# Patient Record
Sex: FEMALE | Race: BLACK OR AFRICAN AMERICAN | NOT HISPANIC OR LATINO | Employment: FULL TIME | URBAN - METROPOLITAN AREA
[De-identification: names, ages, dates, MRNs, and addresses within clinical notes are randomized per-mention and may not be internally consistent; named-entity substitution may affect disease eponyms.]

---

## 2017-05-01 ENCOUNTER — PATIENT MESSAGE (OUTPATIENT)
Dept: HEPATOLOGY | Facility: CLINIC | Age: 37
End: 2017-05-01

## 2017-05-01 DIAGNOSIS — R79.89 ELEVATED LFTS: Primary | ICD-10-CM

## 2017-06-09 ENCOUNTER — OFFICE VISIT (OUTPATIENT)
Dept: HEPATOLOGY | Facility: CLINIC | Age: 37
End: 2017-06-09
Payer: COMMERCIAL

## 2017-06-09 ENCOUNTER — HOSPITAL ENCOUNTER (OUTPATIENT)
Dept: RADIOLOGY | Facility: HOSPITAL | Age: 37
Discharge: HOME | End: 2017-06-09
Attending: NURSE PRACTITIONER
Payer: COMMERCIAL

## 2017-06-09 VITALS
HEART RATE: 89 BPM | BODY MASS INDEX: 32.18 KG/M2 | OXYGEN SATURATION: 99 % | WEIGHT: 188.5 LBS | DIASTOLIC BLOOD PRESSURE: 80 MMHG | SYSTOLIC BLOOD PRESSURE: 130 MMHG | HEIGHT: 64 IN | RESPIRATION RATE: 18 BRPM

## 2017-06-09 DIAGNOSIS — R79.89 ELEVATED LFTS: ICD-10-CM

## 2017-06-09 DIAGNOSIS — R79.89 ELEVATED LFTS: Primary | ICD-10-CM

## 2017-06-09 PROCEDURE — 76700 US EXAM ABDOM COMPLETE: CPT | Mod: 26,,, | Performed by: RADIOLOGY

## 2017-06-09 PROCEDURE — 76700 US EXAM ABDOM COMPLETE: CPT | Mod: TC

## 2017-06-09 PROCEDURE — 99213 OFFICE O/P EST LOW 20 MIN: CPT | Mod: S$GLB,,, | Performed by: NURSE PRACTITIONER

## 2017-06-09 PROCEDURE — 99999 PR PBB SHADOW E&M-EST. PATIENT-LVL III: CPT | Mod: PBBFAC,,, | Performed by: NURSE PRACTITIONER

## 2017-06-09 RX ORDER — ESTAZOLAM 2 MG/1
TABLET ORAL
Refills: 1 | COMMUNITY
Start: 2017-04-19 | End: 2018-06-01

## 2017-06-09 NOTE — PROGRESS NOTES
Hepatology Follow-up    Original Referring Physician:  Dr. Marco Antonio Barton  Current Corresponding Physician:  Dr. Marco Antonio Barton    Subjective:     Zeferino Moon is here for follow up of Follow-up      HPI  Ms. Zeferino Moon' is a 36 yo female here for f/u of elevated LFTs   Liver w/u serologies negative for A1AT, Eben's, AIH, HH  Outside HCV in 2015 showed reactive HCV Ab w/ undetectable RNA  Repeat labs here on 2/2016 and 8/2016 showed HCV Ab negative w/ negative RNA quant  + immunity to HAV/HBV  fibroscan showed no fibrosis, F0 (2/2/16)  Imaging was essentially w/ unremarkable liver and spleen  There is known family hx of fatty liver in mother, no known cirrhosis  Alcohol use is very occasional  She is overweight w/ a BMI of 32    LFTs with elevated transaminases since at least 2014, w/ normal synthetic liver function  She has managed to lose about 20# with lifestyle changes with a concomitant decrease in transaminases.    US 5/1/17:  Pancreas:  Normal.     Aorta:  No aneurysm.      Inferior vena cava:  Normal in appearance.    Gallbladder:  Contains 2 mobile gallstones measuring up to 6.0 mm. There is no gallbladder wall thickening or pericholecystic fluid. Sonographic Kim's sign is negative. The common duct is nondilated measuring 2.0 mm. There are no dilated intrahepatic radicles present.    Liver:  At the upper limits of normal in size measuring 17.7 cm.  The liver demonstrates homogenous echotexture. The hepatorenal adnexa is 1.59.  No focal hepatic lesions are seen.    Right kidney:  Normal in size measuring 10.9 cm without nephrolithiasis or hydronephrosis.      Left kidney:  Normal in size measuring 11.3 cm without nephrolithiasis or hydronephrosis.      Spleen:  Normal in size measuring 8.4 x 4.1 cm with a homogeneous echotexture.    Miscellaneous:  No ascites    Review of Systems   Constitutional: Negative for activity change, appetite change, chills, diaphoresis, fatigue, fever and  unexpected weight change.   HENT: Negative for facial swelling and nosebleeds.    Respiratory: Negative for cough, chest tightness and shortness of breath.    Cardiovascular: Negative for chest pain, palpitations and leg swelling.   Gastrointestinal: Negative for abdominal distention, abdominal pain, blood in stool, constipation, diarrhea, nausea and vomiting.   Musculoskeletal: Negative for neck pain and neck stiffness.   Skin: Negative for color change, pallor and rash.   Neurological: Negative for dizziness, tremors, syncope, weakness, light-headedness and headaches.   Hematological: Negative for adenopathy. Does not bruise/bleed easily.   Psychiatric/Behavioral: Negative for agitation, behavioral problems, confusion and decreased concentration.       Objective:     Physical Exam   Constitutional: She is oriented to person, place, and time. She appears well-developed and well-nourished. No distress.   HENT:   Head: Normocephalic and atraumatic.   Mouth/Throat: No oropharyngeal exudate.   Eyes: Conjunctivae are normal. Pupils are equal, round, and reactive to light. No scleral icterus.   Neck: Normal range of motion.   Cardiovascular: Normal rate.    Pulmonary/Chest: Effort normal.   Abdominal: Soft. She exhibits no distension and no mass. There is no tenderness. There is no rebound and no guarding.   Musculoskeletal: Normal range of motion.   Neurological: She is alert and oriented to person, place, and time. Coordination normal.   Skin: Skin is warm and dry. No rash noted. She is not diaphoretic. No erythema. No pallor.   Psychiatric: She has a normal mood and affect. Her behavior is normal. Judgment and thought content normal.       MELD-Na score: 6 at 6/9/2017  1:24 PM  MELD score: 6 at 6/9/2017  1:24 PM  Calculated from:  Serum Creatinine: 0.7 mg/dL (Rounded to 1) at 6/9/2017  1:24 PM  Serum Sodium: 138 mmol/L (Rounded to 137) at 6/9/2017  1:24 PM  Total Bilirubin: 0.5 mg/dL (Rounded to 1) at 6/9/2017  1:24  PM  INR(ratio): 1.0 at 6/9/2017  1:24 PM  Age: 37 years    WBC   Date Value Ref Range Status   06/09/2017 3.23 (L) 3.90 - 12.70 K/uL Final     Hemoglobin   Date Value Ref Range Status   06/09/2017 13.3 12.0 - 16.0 g/dL Final     Hematocrit   Date Value Ref Range Status   06/09/2017 39.6 37.0 - 48.5 % Final     Platelets   Date Value Ref Range Status   06/09/2017 281 150 - 350 K/uL Final     BUN, Bld   Date Value Ref Range Status   06/09/2017 8 6 - 20 mg/dL Final     Creatinine   Date Value Ref Range Status   06/09/2017 0.7 0.5 - 1.4 mg/dL Final     Glucose   Date Value Ref Range Status   06/09/2017 80 70 - 110 mg/dL Final     Calcium   Date Value Ref Range Status   06/09/2017 9.6 8.7 - 10.5 mg/dL Final     Sodium   Date Value Ref Range Status   06/09/2017 138 136 - 145 mmol/L Final     Potassium   Date Value Ref Range Status   06/09/2017 4.3 3.5 - 5.1 mmol/L Final     Chloride   Date Value Ref Range Status   06/09/2017 102 95 - 110 mmol/L Final     AST   Date Value Ref Range Status   06/09/2017 38 10 - 40 U/L Final     ALT   Date Value Ref Range Status   06/09/2017 64 (H) 10 - 44 U/L Final     Alkaline Phosphatase   Date Value Ref Range Status   06/09/2017 80 55 - 135 U/L Final     Total Bilirubin   Date Value Ref Range Status   06/09/2017 0.5 0.1 - 1.0 mg/dL Final     Comment:     For infants and newborns, interpretation of results should be based  on gestational age, weight and in agreement with clinical  observations.  Premature Infant recommended reference ranges:  Up to 24 hours.............<8.0 mg/dL  Up to 48 hours............<12.0 mg/dL  3-5 days..................<15.0 mg/dL  6-29 days.................<15.0 mg/dL       Albumin   Date Value Ref Range Status   06/09/2017 3.9 3.5 - 5.2 g/dL Final     INR   Date Value Ref Range Status   06/09/2017 1.0 0.8 - 1.2 Final     Comment:     Coumadin Therapy:  2.0 - 3.0 for INR for all indicators except mechanical heart valves  and antiphospholipid syndromes which  should use 2.5 - 3.5.       No results found for: TACROLIMUS, CYCLOSPORINE, SIROLIMUS        Assessment/Plan:     1. Elevated LFTs    2. BMI 32.0-32.9,adult      Zeferino Moon is a 37 y.o. female withFollow-up    Elevated LFTs: etiology remains unclear but NAFLD is most likely  -emphasized continued weight loss measures  -repeat LFTs in 6 months, imaging in 1 year    RTC 1 year    EDUCATION:   -We discussed the manifestations of NAFLD. At this time there is no FDA approved therapy for NAFLD.   -The patient and I discussed the importance of diet, exercise, and weight loss for management of NAFLD. We discussed a low fat, low carb/low sugar, high fiber diet, and a goal of 30 minutes of exercise 5 times per week.   -Pt is aware that fatty liver can progress to steatohepatitis and possibly to cirrhosis, putting one at increased risk for liver cancer, liver failure, and death.     RTC 1 year with pre visit labs/US    Sari Worthy NP

## 2018-05-16 ENCOUNTER — TELEPHONE (OUTPATIENT)
Dept: HEPATOLOGY | Facility: CLINIC | Age: 38
End: 2018-05-16

## 2018-05-16 DIAGNOSIS — R74.8 ELEVATED LIVER ENZYMES: Primary | ICD-10-CM

## 2018-06-01 ENCOUNTER — OFFICE VISIT (OUTPATIENT)
Dept: HEPATOLOGY | Facility: CLINIC | Age: 38
End: 2018-06-01
Payer: COMMERCIAL

## 2018-06-01 ENCOUNTER — HOSPITAL ENCOUNTER (OUTPATIENT)
Dept: RADIOLOGY | Facility: HOSPITAL | Age: 38
Discharge: HOME | End: 2018-06-01
Attending: NURSE PRACTITIONER
Payer: COMMERCIAL

## 2018-06-01 ENCOUNTER — PROCEDURE VISIT (OUTPATIENT)
Dept: HEPATOLOGY | Facility: CLINIC | Age: 38
End: 2018-06-01
Attending: NURSE PRACTITIONER
Payer: COMMERCIAL

## 2018-06-01 VITALS
RESPIRATION RATE: 18 BRPM | WEIGHT: 205.94 LBS | HEIGHT: 64 IN | HEART RATE: 90 BPM | OXYGEN SATURATION: 100 % | BODY MASS INDEX: 35.16 KG/M2 | DIASTOLIC BLOOD PRESSURE: 64 MMHG | TEMPERATURE: 99 F | SYSTOLIC BLOOD PRESSURE: 127 MMHG

## 2018-06-01 DIAGNOSIS — R74.8 ELEVATED LIVER ENZYMES: ICD-10-CM

## 2018-06-01 DIAGNOSIS — K76.0 NAFLD (NONALCOHOLIC FATTY LIVER DISEASE): ICD-10-CM

## 2018-06-01 DIAGNOSIS — R74.8 ELEVATED LIVER ENZYMES: Primary | ICD-10-CM

## 2018-06-01 DIAGNOSIS — E66.09 CLASS 2 OBESITY DUE TO EXCESS CALORIES WITH BODY MASS INDEX (BMI) OF 35.0 TO 35.9 IN ADULT, UNSPECIFIED WHETHER SERIOUS COMORBIDITY PRESENT: ICD-10-CM

## 2018-06-01 PROBLEM — E66.812 CLASS 2 OBESITY DUE TO EXCESS CALORIES WITH BODY MASS INDEX (BMI) OF 35.0 TO 35.9 IN ADULT: Status: ACTIVE | Noted: 2018-06-01

## 2018-06-01 PROCEDURE — 76700 US EXAM ABDOM COMPLETE: CPT | Mod: 26,,, | Performed by: RADIOLOGY

## 2018-06-01 PROCEDURE — 99999 PR PBB SHADOW E&M-EST. PATIENT-LVL III: CPT | Mod: PBBFAC,,, | Performed by: NURSE PRACTITIONER

## 2018-06-01 PROCEDURE — 99214 OFFICE O/P EST MOD 30 MIN: CPT | Mod: S$GLB,,, | Performed by: NURSE PRACTITIONER

## 2018-06-01 PROCEDURE — 91200 LIVER ELASTOGRAPHY: CPT | Mod: S$GLB,,, | Performed by: NURSE PRACTITIONER

## 2018-06-01 PROCEDURE — 3008F BODY MASS INDEX DOCD: CPT | Mod: CPTII,S$GLB,, | Performed by: NURSE PRACTITIONER

## 2018-06-01 PROCEDURE — 76700 US EXAM ABDOM COMPLETE: CPT | Mod: TC

## 2018-06-01 NOTE — PATIENT INSTRUCTIONS
1. Await labs done today  2. Weight loss goal of 15-20 lbs  3. Low fat, low cholesterol, low carb, high fiber, high protein diet  4. Exercise, 5 days a week, 30 min a day  5. Recommend good control of cholesterol, blood pressure, blood sugar levels  6. If autoimmune markers suggestive of possible autoimmune hepatitis, would recommend liver biopsy  7. Follow up in 1 year        Nonalcoholic Fatty Liver Disease (NAFLD)  Nonalcoholic fatty liver disease (NAFLD) is a common disease of the liver. It occurs when you have too much fat in the liver. If NAFLD is severe, it can cause liver damage that seems like the damage caused by drinking too much alcohol. But NAFLD is not caused by drinking alcohol. This sheet tells you more about NAFLD and how it can be managed.    How the liver works   The liver is an organ in the upper right side of the belly (abdomen). It has many important jobs. These include:  · Breaking down (metabolizing) proteins, carbohydrates, and fats  · Making a substance called bile that helps break down fats  · Storing and releasing sugar (glucose) into the blood to give the body energy  · Removing toxins from the blood  · Helping with blood clotting  Understanding NAFLD  A healthy liver may contain some fat. But if too much fat builds up in the liver, this causes NAFLD. NAFLD can be mild, causing fatty liver. Or it can be more severe and show inflammation, as well as the fat. This can cause non-alcoholic steatohepatitis (ROBERTS).  · Fatty liver. With fatty liver, the liver simply has more fat than normal. This extra fat usually does not harm the liver.  · ROBERTS. With ROBERTS, the fatty liver becomes inflamed over time. ROBERTS is serious because it can lead to scarring of the liver (fibrosis). Over time, the scarring may lead to cirrhosis of the liver. This can eventually cause liver failure or liver cancer.  Causes and risk factors of NAFLD  Doctors don't know what causes NAFLD. But certain things make the problem  more likely to happen. These include:  · Obesity  · Prediabetes or diabetes  · High levels of fat found in the blood (cholesterol and triglycerides)  · Being exposed to certain medicines   Symptoms of NAFLD  Most people with NAFLD have no symptoms. If symptoms do occur, they can include:  · Tiredness  · Weakness  · Weight loss  · Loss of appetite  · Nausea and vomiting  · Belly pain and cramping  · Yellowing of the skin and eyes (jaundice), as well as dark urine, or light-colored stools  · Swelling in the belly or legs  Diagnosing NAFLD  Your healthcare provider may think you have NAFLD if routine blood tests show high levels of liver enzymes. This may mean you have a liver problem. You may need one or more imaging tests, such as an ultrasound, CT, or MRI. You may need more blood tests to look for other causes of liver disease. You may also need a liver biopsy. During this test, a hollow needle is used to remove a tiny tissue sample from your liver. This tissue is then checked in a lab. This test can find signs of damage to liver tissue. It can also help figure out the cause of the damage and tell the difference between fatty liver and ROBERTS.  Treating NAFLD  Treatment for NAFLD varies for each person. The best early treatment is to treat any underlying conditions causing metabolic syndrome. This is the name for a group of conditions that includes:  · High blood pressure  · High levels of cholesterol and triglycerides  · Being overweight or obese  · Diabetes  Your healthcare provider will monitor your health and treat any symptoms or underlying health problems you have. Your provider will also work with you to control your risk factors. This will make liver damage less likely. In fact, treating those underlying conditions can often improve liver disease. You may need to take certain medicines, but no medicine will cure NAFLD. This is why treating the underlying conditions is most important. Your plan may  include:  · Losing extra weight  · Getting regular exercise  · Controlling diabetes and high cholesterol or triglyceride levels  · Taking medicines and vitamins as prescribed by your provider  · Quitting smoking  · Not drinking alcohol  · Eating a healthy and balanced diet  Living with NAFLD  If NAFLD is caught early, it can be managed with treatment. Your healthcare provider will discuss further treatment choices with you as needed.  Be sure to ask your provider about recommended vaccines. These include vaccines for viruses that can cause liver disease.  Date Last Reviewed: 12/1/2016 © 2000-2017 [employer]. [address] [place] [address], [place] [address]. All rights reserved. This information is not intended as a substitute for professional medical care. Always follow your healthcare professional's instructions.

## 2018-06-01 NOTE — PROCEDURES
Procedures Fibroscan Procedure     Name: Zeferino Moon  Date of Procedure : 2018   :: Karen Lockhart NP  Diagnosis: NAFLD    Probe: XL    Fibroscan reading: 3.9 KPa    Fibrosis:F 0-1     CAP readin dB/m    Steatosis: :S3

## 2018-06-01 NOTE — PROGRESS NOTES
[facility] Established Patient Visit    Reason for Visit:  F/u elevated liver enzymes    PCP: Agueda John    HPI:  This is a 38 y.o. female here for f/u of elevated liver enzymes. She is a new pt to me today. Has been followed previously by Sari Worthy NP. Last seen 6/2017. She has had mildly elevated transaminases with normal alk phos and Tbili. ALT > AST. She has had a serological workup was negative for Eben's, alpha-1 antitrypsin deficiency, hemochromatosis, and viral hepatitis. She has not had an STEPH, AMA, ASMA checked. These were drawn today with labs. IgG was mildly elevated at 1805 in 2/2016 and is borderline elevated again today at 1676. Transaminases higher today. Her u/s shows hepatomegaly at 18.3 cm with steatosis, no masses. Spleen nl at 8.8 cm. Her Fibroscan in 2/2016 showed no fibrosis. Fibroscan again today shows no fibrosis but S3 steatosis. She had lost a little weight at last visit but has gained it back. She is 17 lbs higher than last year. She does not have HLD, HTN, or DM. BMI 35. She denies any significant alcohol use. She reports she is not currently dieting or exercising. She does not take any medications. Denies any herbal supplements. She denies jaundice, dark urine, hematemesis, melena, slowed mentation, abdominal distention. Is immune to hep A and B per labs. 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 She has 3 kids, 16, 9 and 4.       ROS:   GENERAL: Denies fever, chills, (+) weight loss, no fatigue  HEENT: Denies headaches, dizziness, vision/hearing changes  CARDIOVASCULAR: Denies chest pain, palpitations, or edema  RESPIRATORY: Denies dyspnea, cough  GI: Denies abdominal pain, rectal bleeding, nausea, vomiting. No change in bowel pattern or color  : Denies dysuria, hematuria   SKIN: Denies rash, itching   NEURO: Denies confusion, memory loss, or mood changes  PSYCH: Denies depression or anxiety  HEME/LYMPH: Denies easy bruising or bleeding      PMHX:  " has no past medical history on file.    PSHX:  has a past surgical history that includes  section and Tonsillectomy.    The patient's social and family histories were reviewed by me and updated in the appropriate section of the electronic medical record.    Review of patient's allergies indicates:   Allergen Reactions    Sulfamethoxazole-trimethoprim        Current Outpatient Prescriptions on File Prior to Visit   Medication Sig Dispense Refill    [DISCONTINUED] azithromycin (Z-SARAH) 250 MG tablet 2 tablets on first day, 1 tablet daily after for 5 days total 6 tablet 1    [DISCONTINUED] levocetirizine (XYZAL) 5 MG tablet Take 1 tablet (5 mg total) by mouth every evening. 30 tablet 11    [DISCONTINUED] MICROGESTIN 1/20, 21, 1-20 mg-mcg per tablet TK 1 T PO QD  1     No current facility-administered medications on file prior to visit.          Objective Findings:    PHYSICAL EXAM:   Friendly Black or  female, in no acute distress; alert and oriented to person, place and time  VITALS: /64 (BP Location: Left arm, Patient Position: Sitting)   Pulse 90   Temp 98.6 °F (37 °C) (Oral)   Resp 18   Ht 5' 4" (1.626 m)   Wt 93.4 kg (205 lb 14.6 oz)   SpO2 100%   BMI 35.34 kg/m²   HENT: Normocephalic, without obvious abnormality. Oral mucosa pink and moist. Dentition good.  EYES: Sclerae anicteric.    NECK: Supple.   CARDIOVASCULAR: Regular rate and rhythm. No murmurs.  RESPIRATORY: Normal respiratory effort. BBS CTA. No wheezes or crackles.  GI: Soft, non-tender, non-distended. No hepatosplenomegaly. No masses palpable. No ascites.  EXTREMITIES:  No clubbing, cyanosis or edema.  SKIN: Warm and dry. No jaundice. No rashes noted to exposed skin. No telangectasias noted. No palmar erythema.  NEURO:  Normal gate. No asterixis.  PSYCH:  Memory intact. Thought and speech pattern appropriate. Behavior normal. No depression or anxiety noted.      Labs:  Lab Results   Component Value Date    WBC " 4.28 06/01/2018    HGB 12.3 06/01/2018    HCT 38.1 06/01/2018     06/01/2018     06/01/2018    K 4.2 06/01/2018    CREATININE 0.7 06/01/2018     (H) 06/01/2018    AST 55 (H) 06/01/2018    ALKPHOS 68 06/01/2018    BILITOT 0.4 06/01/2018    ALBUMIN 3.7 06/01/2018    INR 1.0 06/01/2018    AFP 7.5 06/09/2017     ABD U/S 6/1/18  FINDINGS:  Liver: Enlarged extending below the costal margin and measuring 18.3 cm. Fatty liver infiltration with hepatic renal index of 1.4.  No focal hepatic lesions.    Gallbladder: Multiple gallstones are seen.  There is no gallbladder wall thickening or pericholecystic fluid.  No sonographic Kim's sign.    Biliary system: The common duct is not dilated, measuring 3 mm.  No intrahepatic ductal dilatation.    Spleen: Normal in size with a homogeneous echotexture, measuring 8.8 x 3.4 cm.    Pancreas: The visualized portions of pancreas appear normal.    Right kidney: Normal in size with no hydronephrosis, measuring 11.0 cm.    Left kidney:  Normal in size with no hydronephrosis, measuring 11.5 cm.    Aorta: No aneurysm.    Inferior vena cava: Normal in appearance.    Miscellaneous: No ascites.   Impression       Hepatomegaly with hepatic steatosis.    Cholelithiasis without acute cholecystitis.       ASSESSMENT:  38 y.o. Black or  female with:  1.  Elevated liver enzymes, likely due to NAFLD  -- enzymes higher today but has gained 17 lbs since last year  -- u/s shows fatty liver  -- serological workup was negative for Eben's, alpha-1 antitrypsin deficiency, hemochromatosis, and viral hepatitis.  -- has not had an STEPH, AMA, ASMA checked, drawn today, results pending. IgG was mildly elevated at 1805 in 2/2016 and is borderline elevated again today at 1676  -- normal alk phos and Tbili  2. NAFLD  -- transaminases elevated, ALT > AST  -- US shows hepatomegaly with steatosis  -- synthetic liver function nl  -- risk factors for fatty liver include obesity   --  Fibroscan today = F0-F1, no fibrosis, CAP = 311, S3/ > 67% steatosis    -- (+) hep A and B immunity per labs      EDUCATION:   We discussed the manifestations of NAFLD. At this time there is no FDA approved therapy for NAFLD.   The patient and I discussed the importance of diet, exercise, and weight loss for management of NAFLD. We discussed a low fat, low carb/low sugar, high fiber diet, and a goal of 30 minutes of exercise 5 times per week.   Pt is aware that fatty liver can progress to steatohepatitis and possibly to cirrhosis, putting one at increased risk for liver cancer, liver failure, and death.          PLAN:  1. Await labs done today  2. Weight loss goal of 15-20 lbs  3. Low fat, low cholesterol, low carb, high fiber, high protein diet  4. Exercise, 5 days a week, 30 min a day  5. Recommend good control of cholesterol, blood pressure, blood sugar levels  6. If autoimmune markers suggestive of possible autoimmune hepatitis, would recommend liver biopsy  7. Follow up in 1 year       Thank you for allowing me to participate in the care of Zeferino Lockhart, NP-C     CC: Agueda John MD

## 2018-06-06 ENCOUNTER — PATIENT MESSAGE (OUTPATIENT)
Dept: HEPATOLOGY | Facility: CLINIC | Age: 38
End: 2018-06-06

## 2018-06-07 ENCOUNTER — TELEPHONE (OUTPATIENT)
Dept: HEPATOLOGY | Facility: CLINIC | Age: 38
End: 2018-06-07

## 2018-06-07 ENCOUNTER — PATIENT MESSAGE (OUTPATIENT)
Dept: HEPATOLOGY | Facility: CLINIC | Age: 38
End: 2018-06-07

## 2018-06-07 DIAGNOSIS — R76.8 ANTIMITOCHONDRIAL ANTIBODY POSITIVE: Primary | ICD-10-CM

## 2018-06-07 DIAGNOSIS — R74.8 ELEVATED LIVER ENZYMES: ICD-10-CM

## 2018-06-07 NOTE — PATIENT INSTRUCTIONS
Understanding the Liver Biopsy Procedure    A liver biopsy is a special procedure thats safe and quick. It can help your healthcare provider find out how healthy your liver is.  The Liver  The liver is a large organ in the upper right part of the abdominal cavity. A healthy liver metabolizes proteins, carbohydrates, and fats. It also makes a digestive fluid (bile) and removes blood toxins.  Who needs a liver biopsy?  A liver biopsy may be done if you have:  · Symptoms of a liver problem. These include yellowing skin and eyes or dark urine (jaundice) from infection, scarring, or damage from medicines.  · Abnormal liver imaging tests, blood tests, or liver enzymes  · A chronic liver condition, such as hepatitis or nonalcoholic fatty liver disease   · An abnormal liver scan  What a liver biopsy can do  A liver biopsy helps your healthcare provider diagnose a liver problem, such as cirrhosis or a fatty liver. He or she looks at your liver cells under the microscope. It also helps the healthcare provider in finding the cause of your liver problem and how serious it is.     Possible risks and complications  Risks and complications can include the following:  · Infection  · Internal bleeding from the liver  · Bile leakage  · Rectal bleeding  · Pain  · Damage to organs near the liver. These include the lungs, gallbladder, kidneys, or intestines.  · Need for a second liver biopsy if not enough liver tissue was taken the first time   Date Last Reviewed: 7/1/2016 © 2000-2017 [employer]. [address] [place] [address]. All rights reserved. This information is not intended as a substitute for professional medical care. Always follow your healthcare professional's instructions.        Liver Biopsy     Your health care provider will give you an ultrasound or CT scan of your lower chest and upper abdominal area to help find the best site for your biopsy.     During a liver biopsy, your healthcare  provider puts a needle through your skin and into your liver. He or she removes a small sample of liver tissue and sends it to a lab to be looked at. In some cases, the biopsy is done by moving a catheter through a vein into the liver area. This method is less common.  Before your liver biopsy, ask your healthcare provider any questions you have.   Getting ready  · Be sure to have any blood tests that your healthcare provider orders.  · Follow any directions youre given for not eating or drinking before the biopsy.  · Arrange for someone to drive you home after your biopsy.  · Stop taking aspirin, and other medicines as directed, 1 week before the biopsy.  · Tell your provider about all of the medicines you are taking. Ask if you should stop taking any of them. This includes:  ¨ Blood-thinning medicines. This includes aspirin and non-steroidal anti-inflammatory drugs (NSAIDs) such as ibuprofen and naproxen. It also includes medicines that prevent blood clots, such as warfarin.  ¨ Medicines for heart conditions  ¨ Over-the-counter medicines  ¨ All prescription medicines  ¨ Street drugs  ¨ Herbs, vitamins, and other supplements  During the procedure  · You will change into a hospital gown. You will lie on your back or your left side. Part of your body is draped.  · Your health care provider checks your blood pressure, pulse, breathing, and temperature.   · Your provider may give you medicine through an IV (intravenous) line to help you relax. He or she will also put medicine on your skin around the biopsy site to numb it.  · He or she puts a small needle through a tiny cut (incision) in your belly (abdominal) wall into the liver.  · He or she will take out a small sample of liver tissue. While this is done, you will be told to hold your breath. The needle is taken out.  · A health care provider places a bandage over the incision site. He or she may ask you to lie for a while on your right side. A pillow or special  sandbag may be used to put pressure on the incision site.  · You will be watched for a few hours after your biopsy. You can then go home if you have no pain or signs of bleeding.  After the procedure  Have someone drive you home after your liver biopsy. You may feel some pain near the biopsy site or in your right shoulder. This shoulder pain is called referred pain.  When you are home:  · Get plenty of rest  · Avoid alcohol  · Dont lift anything more than 15 to 20 pounds for a week  · Dont exercise for 5 to 7 days  · Don't take aspirin  · Ask your provider when you should start taking any other blood-thinning medicines  · Follow any other directions from your healthcare provider  Getting your results  Getting your biopsy results may take a few days. When the results are ready, your healthcare provider will discuss them with you.  When to call your provider  Call your healthcare provider right away if you have any of these:  · Severe pain near the biopsy site or in your belly or chest  · Fainting or feeling lightheaded  · Trouble breathing  · A fever of 100.4°F (38°C) or higher, or as directed by your healthcare provider  · Feeling weak  · Sweating  · Bleeding from the incision site  · Blood in your stool or black, tarry stools  · Swollen belly   Date Last Reviewed: 7/1/2016  © 7045-9566 [employer]. [address] [place] [address], [place] [address]. All rights reserved. This information is not intended as a substitute for professional medical care. Always follow your healthcare professional's instructions.

## 2018-06-07 NOTE — TELEPHONE ENCOUNTER
Called pt to discuss (+) AMA results. She does not have elevated alk phos, only transaminases. Advised we do liver biopsy for full assessment to evaluate for early stage PBC. Discussed liver biopsy procedure and possible complications associated with liver biopsy including pain, bleeding, infection, and organ perforation. Reviewed the role of the procedure including confirming of diagnosis and staging of liver disease so pt can be appropriately followed from this point forward.     Pt agrees to proceed with u/s guided liver biopsy. She had labs and u/s recently. Please coordinate. She says Friday's are good days for her to potentially get biopsy scheduled.     Will need f/u appt with me one week after biopsy to review results.

## 2018-06-08 NOTE — TELEPHONE ENCOUNTER
Received message from Karen Lockhart NP to arrange for the patient to have a Liver Biopsy.  Patient completed the Pre Procedure testing (labs and u/s) on 6/1/18.    Patient called at home and message relayed from Karen. But was aware and would like to proceed with the procedure.    Patient would like to do the Liver Biopsy on Friday 6/15/18 with a possible check in for 7:30 am.  Pre and Post procedure teaching done. Pt has written instructions given to her with Office Visit.  Patient verbalized understanding.    Request faxed to Radiology for an appt.

## 2018-06-11 ENCOUNTER — TELEPHONE (OUTPATIENT)
Dept: HEPATOLOGY | Facility: CLINIC | Age: 38
End: 2018-06-11

## 2018-06-11 DIAGNOSIS — R76.8 ANTIMITOCHONDRIAL ANTIBODY POSITIVE: Primary | ICD-10-CM

## 2018-06-11 DIAGNOSIS — R74.8 ELEVATED LIVER ENZYMES: ICD-10-CM

## 2018-06-11 NOTE — TELEPHONE ENCOUNTER
Received call from Radiology with approval for the patients' requested date for the U/S Guided Liver Biopsy. The date approved is for Friday 6/15/18 with Dosc (Check in Time) for 7:30 am.    Patient called. Date and time is acceptable. Pre and Post Procedure teaching done with the patient. Voiced understanding and agreed.   Pt asked to schedule her f/u for the results. Pt stated Karen told her to come back in 1 week. Will check with Karen for an appt date and get back with the patient.

## 2018-06-14 RX ORDER — FENTANYL CITRATE 50 UG/ML
100 INJECTION, SOLUTION INTRAMUSCULAR; INTRAVENOUS ONCE
Status: CANCELLED | OUTPATIENT
Start: 2018-06-14 | End: 2018-06-14

## 2018-06-14 RX ORDER — MIDAZOLAM HYDROCHLORIDE 1 MG/ML
2 INJECTION INTRAMUSCULAR; INTRAVENOUS ONCE
Status: CANCELLED | OUTPATIENT
Start: 2018-06-14 | End: 2018-06-14

## 2018-06-15 ENCOUNTER — SURGERY (OUTPATIENT)
Age: 38
End: 2018-06-15

## 2018-06-15 ENCOUNTER — HOSPITAL ENCOUNTER (OUTPATIENT)
Facility: HOSPITAL | Age: 38
Discharge: HOME | End: 2018-06-15
Attending: RADIOLOGY | Admitting: RADIOLOGY
Payer: COMMERCIAL

## 2018-06-15 VITALS
DIASTOLIC BLOOD PRESSURE: 85 MMHG | BODY MASS INDEX: 35 KG/M2 | WEIGHT: 205 LBS | RESPIRATION RATE: 18 BRPM | OXYGEN SATURATION: 99 % | SYSTOLIC BLOOD PRESSURE: 108 MMHG | TEMPERATURE: 98 F | HEIGHT: 64 IN | HEART RATE: 88 BPM

## 2018-06-15 DIAGNOSIS — R74.8 ELEVATED LIVER ENZYMES: ICD-10-CM

## 2018-06-15 DIAGNOSIS — R76.8 ANTIMITOCHONDRIAL ANTIBODY POSITIVE: ICD-10-CM

## 2018-06-15 LAB
B-HCG UR QL: NEGATIVE
CTP QC/QA: YES

## 2018-06-15 PROCEDURE — 81025 URINE PREGNANCY TEST: CPT | Performed by: RADIOLOGY

## 2018-06-15 PROCEDURE — 25000003 PHARM REV CODE 250: Performed by: RADIOLOGY

## 2018-06-15 PROCEDURE — 63600175 PHARM REV CODE 636 W HCPCS: Performed by: RADIOLOGY

## 2018-06-15 RX ORDER — MIDAZOLAM HYDROCHLORIDE 1 MG/ML
INJECTION INTRAMUSCULAR; INTRAVENOUS CODE/TRAUMA/SEDATION MEDICATION
Status: COMPLETED | OUTPATIENT
Start: 2018-06-15 | End: 2018-06-15

## 2018-06-15 RX ORDER — FENTANYL CITRATE 50 UG/ML
INJECTION, SOLUTION INTRAMUSCULAR; INTRAVENOUS CODE/TRAUMA/SEDATION MEDICATION
Status: COMPLETED | OUTPATIENT
Start: 2018-06-15 | End: 2018-06-15

## 2018-06-15 RX ORDER — SODIUM CHLORIDE 9 MG/ML
500 INJECTION, SOLUTION INTRAVENOUS ONCE
Status: COMPLETED | OUTPATIENT
Start: 2018-06-15 | End: 2018-06-15

## 2018-06-15 RX ADMIN — SODIUM CHLORIDE 500 ML: 0.9 INJECTION, SOLUTION INTRAVENOUS at 09:06

## 2018-06-15 RX ADMIN — MIDAZOLAM HYDROCHLORIDE 0.5 MG: 1 INJECTION, SOLUTION INTRAMUSCULAR; INTRAVENOUS at 09:06

## 2018-06-15 RX ADMIN — MIDAZOLAM HYDROCHLORIDE 1 MG: 1 INJECTION, SOLUTION INTRAMUSCULAR; INTRAVENOUS at 09:06

## 2018-06-15 RX ADMIN — FENTANYL CITRATE 25 MCG: 50 INJECTION, SOLUTION INTRAMUSCULAR; INTRAVENOUS at 09:06

## 2018-06-15 RX ADMIN — FENTANYL CITRATE 50 MCG: 50 INJECTION, SOLUTION INTRAMUSCULAR; INTRAVENOUS at 09:06

## 2018-06-15 NOTE — DISCHARGE INSTRUCTIONS
Please call with any questions or concerns.      Monday thru Friday 8:00 am - 4:30 pm    Interventional Radiology   [phone]    After Hours    Ask for the Radiology Intern on call  [phone]

## 2018-06-15 NOTE — DISCHARGE SUMMARY
Radiology Discharge Summary      Hospital Course: No complications    Admit Date: 6/15/2018  Discharge Date: 06/15/2018     Instructions Given to Patient: Yes  Diet: Resume prior diet  Activity: activity as tolerated and no driving for today    Description of Condition on Discharge: Stable  Vital Signs (Most Recent): Temp: 98.9 °F (37.2 °C) (06/15/18 0853)  Pulse: 75 (06/15/18 0945)  Resp: 19 (06/15/18 0945)  BP: (!) 142/93 (06/15/18 0945)  SpO2: 100 % (06/15/18 0945)    Discharge Disposition: Home    Discharge Diagnosis: s/p ultrasound guided liver biopsy     Follow-up: Follow up with VALERIA Chau MD  Resident  Department of Radiology  Pager: [phone]

## 2018-06-15 NOTE — PROGRESS NOTES
US guided liver biopsy procedure complete. Patient tolerated well, no acute signs of distress noted. VSS. Dressing to right abdomen is clean, dry and intact. Patient will remain right side lying until 1050. Patient ready for transfer to [facility].

## 2018-06-15 NOTE — H&P
Radiology History & Physical      SUBJECTIVE:     Chief Complaint: NAFLD, +AMA, elevated liver enzymes    History of Present Illness:  Zeferino Moon is a 38 y.o. female who presents for ultrasound guided random liver biopsy.  Past Medical History:   Diagnosis Date    NAFLD (nonalcoholic fatty liver disease) 2018     Past Surgical History:   Procedure Laterality Date     SECTION      TONSILLECTOMY         Home Meds:   Prior to Admission medications    Not on File     Anticoagulants/Antiplatelets: no anticoagulation    Allergies:   Review of patient's allergies indicates:   Allergen Reactions    Sulfamethoxazole-trimethoprim      Sedation History:  no adverse reactions    Review of Systems:   Hematological: no known coagulopathies  Respiratory: no shortness of breath  Cardiovascular: no chest pain  Gastrointestinal: no abdominal pain  Genito-Urinary: no dysuria  Musculoskeletal: negative  Neurological: no TIA or stroke symptoms         OBJECTIVE:     Vital Signs (Most Recent)  Temp: 98.9 °F (37.2 °C) (06/15/18 0853)  Pulse: 79 (06/15/18 0853)  Resp: 16 (06/15/18 0853)  BP: 127/69 (06/15/18 0853)  SpO2: 100 % (06/15/18 0853)    Physical Exam:  ASA: 2  Mallampati: II    General: no acute distress  Mental Status: alert and oriented to person, place and time  HEENT: normocephalic, atraumatic  Chest: unlabored breathing  Abdomen: nondistended  Extremity: moves all extremities    Laboratory  Lab Results   Component Value Date    INR 1.0 2018       Lab Results   Component Value Date    WBC 4.28 2018    HGB 12.3 2018    HCT 38.1 2018    MCV 90 2018     2018      Lab Results   Component Value Date    GLU 96 2018     2018    K 4.2 2018     2018    CO2 26 2018    BUN 9 2018    CREATININE 0.7 2018    CALCIUM 9.7 2018     (H) 2018    AST 55 (H) 2018    ALBUMIN 3.7 2018    BILITOT 0.4  06/01/2018       ASSESSMENT/PLAN:     Sedation Plan: versed and fentanyl  Patient will undergo ultrasound guided random liver biopsy.    Sanjeev Bell MD  Resident  Department of Radiology  Pager: [phone]

## 2018-06-15 NOTE — PROGRESS NOTES
Pt arrived to [facility] bed 5 for 4 hour post US liver biopsy recovery. Report received from Kenia LANIER. Pt denies pain/discomfort. Dressing CDI. VSS. No acute events. Family to bedside. See flow sheets for post procedure monitoring.

## 2018-06-21 ENCOUNTER — PATIENT MESSAGE (OUTPATIENT)
Dept: HEPATOLOGY | Facility: CLINIC | Age: 38
End: 2018-06-21

## 2018-06-26 ENCOUNTER — OFFICE VISIT (OUTPATIENT)
Dept: HEPATOLOGY | Facility: CLINIC | Age: 38
End: 2018-06-26
Payer: COMMERCIAL

## 2018-06-26 VITALS
WEIGHT: 205 LBS | HEART RATE: 93 BPM | BODY MASS INDEX: 35 KG/M2 | DIASTOLIC BLOOD PRESSURE: 78 MMHG | SYSTOLIC BLOOD PRESSURE: 118 MMHG | TEMPERATURE: 97 F | RESPIRATION RATE: 20 BRPM | OXYGEN SATURATION: 97 % | HEIGHT: 64 IN

## 2018-06-26 DIAGNOSIS — R76.8 ANTIMITOCHONDRIAL ANTIBODY POSITIVE: ICD-10-CM

## 2018-06-26 DIAGNOSIS — R74.8 ELEVATED LIVER ENZYMES: Primary | ICD-10-CM

## 2018-06-26 DIAGNOSIS — K76.0 NAFLD (NONALCOHOLIC FATTY LIVER DISEASE): ICD-10-CM

## 2018-06-26 DIAGNOSIS — E66.09 CLASS 2 OBESITY DUE TO EXCESS CALORIES WITH BODY MASS INDEX (BMI) OF 35.0 TO 35.9 IN ADULT, UNSPECIFIED WHETHER SERIOUS COMORBIDITY PRESENT: ICD-10-CM

## 2018-06-26 PROCEDURE — 3008F BODY MASS INDEX DOCD: CPT | Mod: CPTII,S$GLB,, | Performed by: NURSE PRACTITIONER

## 2018-06-26 PROCEDURE — 99214 OFFICE O/P EST MOD 30 MIN: CPT | Mod: S$GLB,,, | Performed by: NURSE PRACTITIONER

## 2018-06-26 PROCEDURE — 99999 PR PBB SHADOW E&M-EST. PATIENT-LVL III: CPT | Mod: PBBFAC,,, | Performed by: NURSE PRACTITIONER

## 2018-06-26 NOTE — PROGRESS NOTES
[facility] Established Patient Visit    Reason for Visit:  F/u elevated liver enzymes, liver biopsy results    PCP: Agueda John    HPI:  This is a 38 y.o. female here for f/u of elevated liver enzymes and liver biopsy results. She has had mildly elevated transaminases with normal alk phos and Tbili. ALT > AST. She has had a serological workup was negative for Eben's, alpha-1 antitrypsin deficiency, hemochromatosis, and viral hepatitis. She had not had an STEPH, AMA, ASMA checked. AMA came back (+) at 1:640, STEPH, ASMA (-). IgG borderline elevated at 1676. IgM normal. Transaminases higher at last visit. Her u/s showed hepatomegaly at 18.3 cm with steatosis, no masses. Spleen nl at 8.8 cm. Her Fibroscan in 2/2016 showed no fibrosis. Fibroscan again today shows no fibrosis but S3 steatosis.     Liver biopsy was recommended to determine if PBC contributing to her elevated transaminases. Alk phos has been normal. Liver biopsy showed no findings of PBC, no ROBERTS, only macrosteatosis 10% with no fibrosis.    She does not have HLD, HTN, or DM. BMI 35. She denies any significant alcohol use. She reports she is not currently dieting or exercising. She does not take any medications. Denies any herbal supplements. She denies jaundice, dark urine, hematemesis, melena, slowed mentation, abdominal distention. Is immune to hep A and B per labs.       FINAL PATHOLOGIC DIAGNOSIS  LIVER, BIOPSY:  Minimal portal lymphocytic inflammation  Macrovesicular steatosis - 10%  No evidence of steatohepatitis  No evidence of interface or lobular hepatitis  No evidence of increased fibrosis  No evidence of increased iron deposits  Biopsy shows minimal lymphocytic inflammation in the portal tracts. There are no granulomas. Patient's (limited)  medical records have been reviewed and raised/positive AMA (anti-mitochondrial antibody) is noticed which raises  the possibility of Primary biliary Cirrhosis. The findings in this biopsy  "are very minimal and non-specific. If clinical  suspicion of PBC is high, it may represent either an early disease or sampling issues since primary biliary Cirrhosis  is a patchy disease.  Please correlate clinically.  Routine stains trichrome and iron performed.      ROS:   GENERAL: Denies fever, chills, (+) weight gain, no fatigue  HEENT: Denies headaches, dizziness, vision/hearing changes  CARDIOVASCULAR: Denies chest pain, palpitations, or edema  RESPIRATORY: Denies dyspnea, cough  GI: Denies abdominal pain, rectal bleeding, nausea, vomiting. No change in bowel pattern or color  : Denies dysuria, hematuria   SKIN: Denies rash, itching   NEURO: Denies confusion, memory loss, or mood changes  PSYCH: Denies depression or anxiety  HEME/LYMPH: Denies easy bruising or bleeding      PMHX:  has a past medical history of NAFLD (nonalcoholic fatty liver disease) (2018).    PSHX:  has a past surgical history that includes  section; Tonsillectomy; and biopsy of liver with ultrasound guidance (N/A, 6/15/2018).    The patient's social and family histories were reviewed by me and updated in the appropriate section of the electronic medical record.    Review of patient's allergies indicates:   Allergen Reactions    Sulfamethoxazole-trimethoprim        No current outpatient prescriptions on file prior to visit.     No current facility-administered medications on file prior to visit.          Objective Findings:    PHYSICAL EXAM:   Friendly Black or  female, in no acute distress; alert and oriented to person, place and time  VITALS: /78 (BP Location: Left arm, Patient Position: Sitting, BP Method: Large (Automatic))   Pulse 93   Temp 97.3 °F (36.3 °C) (Oral)   Resp 20   Ht 5' 4" (1.626 m)   Wt 93 kg (205 lb 0.4 oz)   LMP 2018   SpO2 97%   BMI 35.19 kg/m²   HENT: Normocephalic, without obvious abnormality. Oral mucosa pink and moist. Dentition good.  EYES: Sclerae anicteric.  "   NECK: Supple.   CARDIOVASCULAR: Regular rate and rhythm. No murmurs.  RESPIRATORY: Normal respiratory effort. BBS CTA. No wheezes or crackles.  GI: Soft, non-tender, non-distended. No hepatosplenomegaly. No masses palpable. No ascites.  EXTREMITIES:  No clubbing, cyanosis or edema.  SKIN: Warm and dry. No jaundice. No rashes noted to exposed skin. No telangectasias noted. No palmar erythema.  NEURO:  Normal gate. No asterixis.  PSYCH:  Memory intact. Thought and speech pattern appropriate. Behavior normal. No depression or anxiety noted.      Labs:  Lab Results   Component Value Date    WBC 4.28 06/01/2018    HGB 12.3 06/01/2018    HCT 38.1 06/01/2018     06/01/2018     06/01/2018    K 4.2 06/01/2018    CREATININE 0.7 06/01/2018     (H) 06/01/2018    AST 55 (H) 06/01/2018    ALKPHOS 68 06/01/2018    BILITOT 0.4 06/01/2018    ALBUMIN 3.7 06/01/2018    INR 1.0 06/01/2018    AFP 7.5 06/09/2017       ASSESSMENT:  38 y.o. Black or  female with:  1.  Elevated liver enzymes, likely due to NAFLD  -- enzymes higher most recently but has gained 17 lbs since last year  -- u/s shows fatty liver  -- serological workup was negative for Eben's, alpha-1 antitrypsin deficiency, hemochromatosis, and viral hepatitis.  -- AMA came back (+) at 1:640, STEPH, ASMA (-). IgG borderline elevated at 1676. IgM normal  -- normal alk phos and Tbili  2. NAFLD  -- transaminases elevated, ALT > AST  -- US shows hepatomegaly with steatosis  -- synthetic liver function nl  -- risk factors for fatty liver include obesity   -- Fibroscan 6/1/18 = F0-F1, no fibrosis, CAP = 311, S3/ > 67% steatosis    -- liver biopsy 6/15/18 - Minimal portal lymphocytic inflammation, no findings of PBC, no ROBERTS, only macrosteatosis 10% with no fibrosis  -- (+) hep A and B immunity per labs  3. AMA (+)  -- no findings of PBC on biopsy and normal alk phos. Recommendations are to monitor at this time. If alk phos becomes elevated or  enzymes remain elevated despite weight loss, can start treatment with ursodiol      EDUCATION:   We discussed the manifestations of NAFLD. At this time there is no FDA approved therapy for NAFLD.   The patient and I discussed the importance of diet, exercise, and weight loss for management of NAFLD. We discussed a low fat, low carb/low sugar, high fiber diet, and a goal of 30 minutes of exercise 5 times per week.   Pt is aware that fatty liver can progress to steatohepatitis and possibly to cirrhosis, putting one at increased risk for liver cancer, liver failure, and death.      Discussed diagnosis, management, prognosis, and pathophysiology of PBC. She could have very early stage disease so just need to monitor for now since alk phos nl and no findings of PBC on biopsy.       PLAN:  1. Labs Q 3 months for hepatic panel  2. Weight loss goal of 15-20 lbs  3. Low fat, low cholesterol, low carb, high fiber, high protein diet  4. Exercise, 5 days a week, 30 min a day  5. Recommend good control of cholesterol, blood pressure, blood sugar levels  6.  Consider starting ursodiol if alk phos becomes elevated or enzymes remain elevated despite weight loss  7. Follow up in 6 months       Thank you for allowing me to participate in the care of Zeferino Red Lockhart, NP-C     CC: Agueda John MD

## 2018-09-26 ENCOUNTER — PATIENT MESSAGE (OUTPATIENT)
Dept: HEPATOLOGY | Facility: CLINIC | Age: 38
End: 2018-09-26

## 2018-12-04 ENCOUNTER — PATIENT MESSAGE (OUTPATIENT)
Dept: HEPATOLOGY | Facility: CLINIC | Age: 38
End: 2018-12-04

## 2018-12-18 ENCOUNTER — TELEPHONE (OUTPATIENT)
Dept: HEPATOLOGY | Facility: CLINIC | Age: 38
End: 2018-12-18

## 2018-12-26 ENCOUNTER — PATIENT MESSAGE (OUTPATIENT)
Dept: HEPATOLOGY | Facility: CLINIC | Age: 38
End: 2018-12-26

## 2019-01-31 ENCOUNTER — OFFICE VISIT (OUTPATIENT)
Dept: HEPATOLOGY | Facility: CLINIC | Age: 39
End: 2019-01-31
Payer: COMMERCIAL

## 2019-01-31 VITALS
BODY MASS INDEX: 35.08 KG/M2 | HEIGHT: 64 IN | DIASTOLIC BLOOD PRESSURE: 82 MMHG | HEART RATE: 82 BPM | SYSTOLIC BLOOD PRESSURE: 142 MMHG | OXYGEN SATURATION: 100 % | WEIGHT: 205.5 LBS | RESPIRATION RATE: 18 BRPM

## 2019-01-31 DIAGNOSIS — R76.8 ANTIMITOCHONDRIAL ANTIBODY POSITIVE: ICD-10-CM

## 2019-01-31 DIAGNOSIS — R10.11 RUQ ABDOMINAL PAIN: ICD-10-CM

## 2019-01-31 DIAGNOSIS — R74.8 ELEVATED LIVER ENZYMES: Primary | ICD-10-CM

## 2019-01-31 DIAGNOSIS — K76.0 NAFLD (NONALCOHOLIC FATTY LIVER DISEASE): ICD-10-CM

## 2019-01-31 DIAGNOSIS — E66.09 CLASS 2 OBESITY DUE TO EXCESS CALORIES WITH BODY MASS INDEX (BMI) OF 35.0 TO 35.9 IN ADULT, UNSPECIFIED WHETHER SERIOUS COMORBIDITY PRESENT: ICD-10-CM

## 2019-01-31 PROCEDURE — 99214 OFFICE O/P EST MOD 30 MIN: CPT | Mod: S$GLB,,, | Performed by: NURSE PRACTITIONER

## 2019-01-31 PROCEDURE — 3008F PR BODY MASS INDEX (BMI) DOCUMENTED: ICD-10-PCS | Mod: CPTII,S$GLB,, | Performed by: NURSE PRACTITIONER

## 2019-01-31 PROCEDURE — 99999 PR PBB SHADOW E&M-EST. PATIENT-LVL III: ICD-10-PCS | Mod: PBBFAC,,, | Performed by: NURSE PRACTITIONER

## 2019-01-31 PROCEDURE — 3008F BODY MASS INDEX DOCD: CPT | Mod: CPTII,S$GLB,, | Performed by: NURSE PRACTITIONER

## 2019-01-31 PROCEDURE — 99214 PR OFFICE/OUTPT VISIT, EST, LEVL IV, 30-39 MIN: ICD-10-PCS | Mod: S$GLB,,, | Performed by: NURSE PRACTITIONER

## 2019-01-31 PROCEDURE — 99999 PR PBB SHADOW E&M-EST. PATIENT-LVL III: CPT | Mod: PBBFAC,,, | Performed by: NURSE PRACTITIONER

## 2019-01-31 NOTE — PROGRESS NOTES
[facility] Established Patient Visit    Reason for Visit:  F/u elevated liver enzymes    PCP: Agueda John    HPI:  This is a 38 y.o. female here for f/u of elevated liver enzymes. She has had mildly elevated transaminases with normal alk phos and Tbili. ALT > AST. AMA (+) at 1:640, STEPH, ASMA (-). IgG borderline elevated at 1676. IgM normal. Her u/s showed hepatomegaly at 18.3 cm with steatosis, no masses. Spleen nl at 8.8 cm. Her Fibroscan in 2/2016 and 6/2018 showed no fibrosis but S3 steatosis.     Liver biopsy 6/2018 showed no findings of PBC, no ROBERTS, only macrosteatosis 10% with no fibrosis. Risk factors for NAFLD include obesity, BMI 35. No HLD, HTN, or DM. She has not lost any weight since last visit to see if this would improve her enzymes. She is thinking of trying Herbal Life again to help her lose weight.     She denies jaundice, dark urine, hematemesis, melena, slowed mentation, abdominal distention. Reports significant pain twice in RUQ since biopsy, sharp stabbing. Sees Dr. Barton for GI. She saw him a few months ago. Has not had EGD. (+) gallstones on u/s 8/2018. She will f/u with him if pain persists/worsens.       FINAL PATHOLOGIC DIAGNOSIS  LIVER, BIOPSY:  Minimal portal lymphocytic inflammation  Macrovesicular steatosis - 10%  No evidence of steatohepatitis  No evidence of interface or lobular hepatitis  No evidence of increased fibrosis  No evidence of increased iron deposits  Biopsy shows minimal lymphocytic inflammation in the portal tracts. There are no granulomas. Patient's (limited) medical records have been reviewed and raised/positive AMA (anti-mitochondrial antibody) is noticed which raises the possibility of Primary biliary Cirrhosis. The findings in this biopsy are very minimal and non-specific. If clinical suspicion of PBC is high, it may represent either an early disease or sampling issues since primary biliary Cirrhosis is a patchy disease. Please correlate  "clinically. Routine stains trichrome and iron performed.      ROS:   GENERAL: Denies fever, chills, weight change, fatigue  HEENT: Denies headaches, dizziness, vision/hearing changes  CARDIOVASCULAR: Denies chest pain, palpitations, or edema  RESPIRATORY: Denies dyspnea, cough  GI: (+) RUQ abdominal pain, rectal bleeding, nausea, vomiting. 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    PSHX:  has a past surgical history that includes  section; Tonsillectomy; biopsy of liver with ultrasound guidance (N/A, 6/15/2018); and Liver biopsy (06/15/2018).    The patient's social and family histories were reviewed by me and updated in the appropriate section of the electronic medical record.    Review of patient's allergies indicates:   Allergen Reactions    Sulfamethoxazole-trimethoprim        Current Outpatient Medications on File Prior to Visit   Medication Sig Dispense Refill    Lactobacillus acidophilus (PROBIOTIC ORAL) Take 1 capsule by mouth every other day.       No current facility-administered medications on file prior to visit.          Objective Findings:    PHYSICAL EXAM:   Friendly Black or  female, in no acute distress; alert and oriented to person, place and time  VITALS: BP (!) 142/82 (BP Location: Right arm, Patient Position: Sitting, BP Method: Medium (Automatic))   Pulse 82   Resp 18   Ht 5' 4" (1.626 m)   Wt 93.2 kg (205 lb 7.5 oz)   SpO2 100%   BMI 35.27 kg/m²   HENT: Normocephalic, without obvious abnormality. Oral mucosa pink and moist. Dentition good.  EYES: Sclerae anicteric.    NECK: Supple.   RESPIRATORY: Normal respiratory effort. "   GI: Non-distended.   EXTREMITIES:  No clubbing, cyanosis or edema.  SKIN: Warm and dry. No jaundice. No rashes noted to exposed skin.   NEURO:  Normal gate.   PSYCH:  Memory intact. Thought and speech pattern appropriate. Behavior normal. No depression or anxiety noted.      Labs:  Lab Results   Component Value Date    WBC 4.44 09/27/2018    HGB 13.3 09/27/2018    HCT 40.9 09/27/2018     09/27/2018     09/27/2018    K 3.8 09/27/2018    CREATININE 0.8 09/27/2018     (H) 01/22/2019    AST 51 (H) 01/22/2019    ALKPHOS 62 01/22/2019    BILITOT 0.4 01/22/2019    ALBUMIN 3.8 01/22/2019    INR 1.0 06/01/2018    AFP 7.5 06/09/2017       ASSESSMENT:  38 y.o. Black or  female with:  1.  Elevated liver enzymes, likely due to NAFLD  -- enzymes higher most recently but has gained 17 lbs since 2016  -- u/s shows fatty liver  -- serological workup was negative for Eben's, alpha-1 antitrypsin deficiency, hemochromatosis, and viral hepatitis.  -- AMA came back (+) at 1:640, STEPH, ASMA (-). IgG borderline elevated at 1676. IgM normal  -- normal alk phos and Tbili  2. NAFLD  -- transaminases elevated, ALT > AST  -- US shows hepatomegaly with steatosis  -- synthetic liver function nl  -- risk factors for fatty liver include obesity   -- Fibroscan 6/1/18 = F0-F1, no fibrosis, CAP = 311, S3 (> 67% steatosis)    -- liver biopsy 6/15/18 - Minimal portal lymphocytic inflammation, no findings of PBC, no ROBERTS, only macrosteatosis 10% with no fibrosis  -- (+) hep A and B immunity per labs  3. AMA (+)  -- no findings of PBC on biopsy and normal alk phos. Recommendations are to monitor at this time. If alk phos becomes elevated or enzymes remain elevated despite weight loss, can start treatment with ursodiol  4. RUQ abd pain  -- discussed this would not be of a consequence of her liver biopsy or her fatty liver. Recommend she f/u with local GI if this worsens/persists      EDUCATION:   We discussed the  manifestations of NAFLD. At this time there is no FDA approved therapy for NAFLD.   The patient and I discussed the importance of diet, exercise, and weight loss for management of NAFLD. We discussed a low fat, low carb/low sugar, high fiber diet, and a goal of 30 minutes of exercise 5 times per week.   Pt is aware that fatty liver can progress to steatohepatitis and possibly to cirrhosis, putting one at increased risk for liver cancer, liver failure, and death.      Discussed diagnosis, management, prognosis, and pathophysiology of PBC. She could have very early stage disease so just need to monitor for now since alk phos nl and no findings of PBC on biopsy.       PLAN:  1. Labs Q 6 months for hepatic panel  2. Weight loss goal of 15-20 lbs  3. Low fat, low cholesterol, low carb, high fiber, high protein diet  4. Exercise, 5 days a week, 30 min a day  5. Recommend good control of cholesterol, blood pressure, blood sugar levels  6. Consider starting ursodiol if alk phos becomes elevated or enzymes remain elevated despite weight loss  7. Discussed referral to bariatric medicine to help with weight loss. Pt will contact me if she wishes to proceed  8. Discouraged use of Herbalife as this has been shown to cause drug induced liver injury. If she decides to try Herbal Life again, would recommend to monitor labs Q 3 months to monitor for hepatotoxicity. Would prefer her to use Rx weight loss meds instead  9. Follow up in 1 year with video visit       Thank you for allowing me to participate in the care of Zeferino Moon    Karen Lockhart, NP-C     CC: MD Dr. Oralia Ratliff, [facility]

## 2019-03-18 ENCOUNTER — PATIENT MESSAGE (OUTPATIENT)
Dept: HEPATOLOGY | Facility: CLINIC | Age: 39
End: 2019-03-18

## 2019-03-18 NOTE — TELEPHONE ENCOUNTER
Please call pt to schedule:  1. lab now for hepatic panel  2. Move lab appt from 7/2019 to 6/2019  3. f/u appt with me in 6/2019 after lab done, can do video visit if pt prefers

## 2019-05-01 PROBLEM — Z00.00 HEALTHCARE MAINTENANCE: Status: ACTIVE | Noted: 2019-05-01

## 2019-06-07 ENCOUNTER — OFFICE VISIT (OUTPATIENT)
Dept: HEPATOLOGY | Facility: CLINIC | Age: 39
End: 2019-06-07
Payer: COMMERCIAL

## 2019-06-07 ENCOUNTER — TELEPHONE (OUTPATIENT)
Dept: HEPATOLOGY | Facility: CLINIC | Age: 39
End: 2019-06-07

## 2019-06-07 DIAGNOSIS — R74.8 ELEVATED LIVER ENZYMES: Primary | ICD-10-CM

## 2019-06-07 DIAGNOSIS — R76.8 ANTIMITOCHONDRIAL ANTIBODY POSITIVE: ICD-10-CM

## 2019-06-07 DIAGNOSIS — K76.0 NAFLD (NONALCOHOLIC FATTY LIVER DISEASE): ICD-10-CM

## 2019-06-07 DIAGNOSIS — E66.09 CLASS 2 OBESITY DUE TO EXCESS CALORIES WITH BODY MASS INDEX (BMI) OF 35.0 TO 35.9 IN ADULT, UNSPECIFIED WHETHER SERIOUS COMORBIDITY PRESENT: ICD-10-CM

## 2019-06-07 DIAGNOSIS — R74.8 ELEVATED CK: ICD-10-CM

## 2019-06-07 PROCEDURE — 99213 OFFICE O/P EST LOW 20 MIN: CPT | Mod: 95,,, | Performed by: NURSE PRACTITIONER

## 2019-06-07 PROCEDURE — 99213 PR OFFICE/OUTPT VISIT, EST, LEVL III, 20-29 MIN: ICD-10-PCS | Mod: 95,,, | Performed by: NURSE PRACTITIONER

## 2019-06-07 PROCEDURE — 99212 OFFICE O/P EST SF 10 MIN: CPT | Performed by: NURSE PRACTITIONER

## 2019-06-07 NOTE — PROGRESS NOTES
[facility] Established Patient Visit    Reason for Visit:  F/u elevated liver enzymes, (+) AMA    PCP: Agueda John    HPI:  This is a 39 y.o. female here for f/u of elevated liver enzymes. She has had mildly elevated transaminases with normal alk phos and Tbili. ALT > AST. AMA (+) at 1:640, STEPH, ASMA (-). IgG borderline elevated at 1676. IgM normal. Her u/s showed hepatomegaly at 18.3 cm with steatosis, no masses. Spleen nl at 8.8 cm. Her Fibroscan in 2/2016 and 6/2018 showed no fibrosis but S3 steatosis.     Liver biopsy 6/2018 showed no findings of PBC, no ROBERTS, only macrosteatosis 10% with no fibrosis. Risk factors for NAFLD include obesity, BMI 35. No HLD, HTN, or DM.     She resumed Herbal Life to help her lose weight and has lost 6 lbs. Reports she has been off this for a while now though. She had emergency cholecystectomy at [facility] on 3/25/19. Reports surgeon told her liver looked really fatty.     Liver enzymes have improved some on lab yesterday. CK checked yesterday was elevated at 331.     She denies jaundice, dark urine, hematemesis, melena, slowed mentation, abdominal distention.       FINAL PATHOLOGIC DIAGNOSIS  LIVER, BIOPSY:  Minimal portal lymphocytic inflammation  Macrovesicular steatosis - 10%  No evidence of steatohepatitis  No evidence of interface or lobular hepatitis  No evidence of increased fibrosis  No evidence of increased iron deposits  Biopsy shows minimal lymphocytic inflammation in the portal tracts. There are no granulomas. Patient's (limited) medical records have been reviewed and raised/positive AMA (anti-mitochondrial antibody) is noticed which raises the possibility of Primary biliary Cirrhosis. The findings in this biopsy are very minimal and non-specific. If clinical suspicion of PBC is high, it may represent either an early disease or sampling issues since primary biliary Cirrhosis is a patchy disease. Please correlate clinically. Routine  stains trichrome and iron performed.      ROS:   GENERAL: Denies fever, chills, (+) weight change, no fatigue  HEENT: Denies headaches, dizziness, vision/hearing changes  CARDIOVASCULAR: Denies chest pain, palpitations, or edema  RESPIRATORY: Denies dyspnea, cough  GI: Denies abdominal pain, rectal bleeding, nausea, vomiting. 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    PSHX:  has a past surgical history that includes  section; Tonsillectomy; Biopsy of liver with ultrasound guidance (N/A, 6/15/2018); and Liver biopsy (06/15/2018).    The patient's social and family histories were reviewed by me and updated in the appropriate section of the electronic medical record.    Review of patient's allergies indicates:   Allergen Reactions    Bactrim [sulfamethoxazole-trimethoprim] Shortness Of Breath        No current outpatient medications on file prior to visit.     No current facility-administered medications on file prior to visit.          Objective Findings:    PHYSICAL EXAM:   Friendly Black or  female, in no acute distress; alert and oriented to person, place and time  VITALS: There were no vitals taken for this visit.  HENT: Normocephalic, without obvious abnormality.   EYES: Sclerae anicteric.    RESPIRATORY: Normal respiratory effort.   SKIN: No jaundice. No rashes noted to exposed skin.    PSYCH:  Memory intact. Thought and speech pattern appropriate. Behavior normal. No depression or anxiety noted.      Labs:  Lab Results   Component Value Date    WBC 4.44 2018    HGB 13.3 2018    HCT 40.9 2018     2018      09/27/2018    K 3.8 09/27/2018    CREATININE 0.8 09/27/2018    ALT 65 (H) 06/06/2019    AST 33 06/06/2019    ALKPHOS 74 06/06/2019    BILITOT 0.2 06/06/2019    ALBUMIN 3.8 06/06/2019    INR 1.0 06/01/2018    AFP 7.5 06/09/2017       ASSESSMENT:  39 y.o. Black or  female with:  1.  Elevated liver enzymes, likely due to NAFLD +/- muscle inflammation  -- enzymes were higher, had gained 17 lbs since 2016. Has lost 6 lbs over past 6 months and enzymes have improved some  -- u/s shows fatty liver  -- serological workup was negative for Eben's, alpha-1 antitrypsin deficiency, hemochromatosis, and viral hepatitis  -- AMA (+) at 1:640, STEPH, ASMA (-). IgG borderline elevated at 1676. IgM normal  -- normal alk phos and Tbili  -- CK mildly elevated at 331  2. NAFLD  -- transaminases elevated, ALT > AST  -- US shows hepatomegaly with steatosis  -- synthetic liver function nl  -- risk factors for fatty liver include obesity   -- Fibroscan 6/1/18 = F0-F1, no fibrosis, CAP = 311, S3 (> 67% steatosis)    -- liver biopsy 6/15/18 - Minimal portal lymphocytic inflammation, no findings of PBC, no ROBERTS, only macrosteatosis 10% with no fibrosis  -- (+) hep A and B immunity per labs  3. AMA (+)  -- no findings of PBC on biopsy and normal alk phos. Recommendations are to monitor at this time. If alk phos becomes elevated or enzymes remain elevated despite weight loss, can start treatment with ursodiol  4. Elevated CK, this may be contributing to transaminase elevation  -- pt denies muscle pain, weakness. AA can have mildly elevated CK levels of no clinical significance  -- will monitor      EDUCATION:   We discussed the manifestations of NAFLD. At this time there is no FDA approved therapy for NAFLD.   The patient and I discussed the importance of diet, exercise, and weight loss for management of NAFLD. We discussed a low fat, low carb/low sugar, high fiber diet, and a goal of 30 minutes of exercise 5 times per week.   Pt  is aware that fatty liver can progress to steatohepatitis and possibly to cirrhosis, putting one at increased risk for liver cancer, liver failure, and death.      Discussed diagnosis, management, prognosis, and pathophysiology of PBC. She could have very early stage disease so just need to monitor for now since alk phos nl and no findings of PBC on biopsy.       PLAN:  1. Labs Q 6 months for hepatic panel and CK  2. Continue efforts at weight loss, goal of 15-20 lbs  3. Low fat, low cholesterol, low carb, high fiber, high protein diet  4. Exercise, 5 days a week, 30 min a day  5. Recommend good control of cholesterol, blood pressure, blood sugar levels  6. Consider starting ursodiol if alk phos becomes elevated or enzymes remain elevated despite weight loss  7. Ok to resume Herbal Life if she wants. Will check labs Q 3 months if she resumes this. Pt to msg me if she will restart  8. Follow up in 1 year with video visit       Thank you for allowing me to participate in the care of Zeferino Alejandra Red Lockhart, NP-C     CC: MD Dr. Oralia Ratliff, [facility]

## 2019-06-07 NOTE — TELEPHONE ENCOUNTER
----- Message from Karen Lockhart NP sent at 6/7/2019  1:42 PM CDT -----  Please schedule  1. Labs in 12/2019, 6/2020 for hepatic pane and CK  2. F/u recall for video visit in 6/2020

## 2019-06-07 NOTE — Clinical Note
Please schedule1. Labs in 12/2019, 6/2020 for hepatic pane and CK2. F/u recall for video visit in 6/2020

## 2019-08-05 PROBLEM — Z00.00 HEALTHCARE MAINTENANCE: Status: RESOLVED | Noted: 2019-05-01 | Resolved: 2019-08-05

## 2019-10-29 ENCOUNTER — TELEPHONE (OUTPATIENT)
Dept: HEPATOLOGY | Facility: CLINIC | Age: 39
End: 2019-10-29

## 2019-10-29 NOTE — TELEPHONE ENCOUNTER
MA messaged the pt on patient portal to let her know that her Ryan labs is canceled but to do her labs in Dec and June.

## 2019-12-12 ENCOUNTER — TELEPHONE (OUTPATIENT)
Dept: HEPATOLOGY | Facility: CLINIC | Age: 39
End: 2019-12-12

## 2019-12-12 NOTE — TELEPHONE ENCOUNTER
Attempted to contact patient regarding a follow up visit with first available provider. Left patient a voicemail stating the purpose for the call. Awaiting call back.

## 2019-12-12 NOTE — TELEPHONE ENCOUNTER
Labs ordered by PAT Lockhart NP reviewed. Already auto released in [facility]. Liver enzymes elevated on recent labs, higher than previous lab. Recommend pt schedule a f/u visit with any provider to discuss next steps and any further recs given liver enzymes remain elevated

## 2019-12-12 NOTE — TELEPHONE ENCOUNTER
----- Message from Ruth Shepherd sent at 12/12/2019  9:19 AM CST -----  calling to get lab results sent to [facility]    Pt contact [phone]

## 2019-12-13 ENCOUNTER — OFFICE VISIT (OUTPATIENT)
Dept: HEPATOLOGY | Facility: CLINIC | Age: 39
End: 2019-12-13
Payer: COMMERCIAL

## 2019-12-13 VITALS
DIASTOLIC BLOOD PRESSURE: 94 MMHG | SYSTOLIC BLOOD PRESSURE: 129 MMHG | RESPIRATION RATE: 18 BRPM | HEART RATE: 96 BPM | WEIGHT: 209.44 LBS | OXYGEN SATURATION: 98 % | BODY MASS INDEX: 35.76 KG/M2 | HEIGHT: 64 IN

## 2019-12-13 DIAGNOSIS — E66.09 CLASS 2 OBESITY DUE TO EXCESS CALORIES WITH BODY MASS INDEX (BMI) OF 35.0 TO 35.9 IN ADULT, UNSPECIFIED WHETHER SERIOUS COMORBIDITY PRESENT: ICD-10-CM

## 2019-12-13 DIAGNOSIS — R76.8 ANTIMITOCHONDRIAL ANTIBODY POSITIVE: ICD-10-CM

## 2019-12-13 DIAGNOSIS — K76.0 NAFLD (NONALCOHOLIC FATTY LIVER DISEASE): Primary | ICD-10-CM

## 2019-12-13 DIAGNOSIS — R74.8 ELEVATED LIVER ENZYMES: ICD-10-CM

## 2019-12-13 PROCEDURE — 3008F PR BODY MASS INDEX (BMI) DOCUMENTED: ICD-10-PCS | Mod: CPTII,S$GLB,, | Performed by: NURSE PRACTITIONER

## 2019-12-13 PROCEDURE — 99999 PR PBB SHADOW E&M-EST. PATIENT-LVL III: CPT | Mod: PBBFAC,,, | Performed by: NURSE PRACTITIONER

## 2019-12-13 PROCEDURE — 99214 OFFICE O/P EST MOD 30 MIN: CPT | Mod: S$GLB,,, | Performed by: NURSE PRACTITIONER

## 2019-12-13 PROCEDURE — 3008F BODY MASS INDEX DOCD: CPT | Mod: CPTII,S$GLB,, | Performed by: NURSE PRACTITIONER

## 2019-12-13 PROCEDURE — 99214 PR OFFICE/OUTPT VISIT, EST, LEVL IV, 30-39 MIN: ICD-10-PCS | Mod: S$GLB,,, | Performed by: NURSE PRACTITIONER

## 2019-12-13 PROCEDURE — 99999 PR PBB SHADOW E&M-EST. PATIENT-LVL III: ICD-10-PCS | Mod: PBBFAC,,, | Performed by: NURSE PRACTITIONER

## 2019-12-13 NOTE — PATIENT INSTRUCTIONS
1. Labs in 3 months    2. Continue weight loss efforts    3. Follow-up in 1 year with video visit          Tips for low/moderate carbohydrate diet:  3 meals a day made up of the following:  -- Unlimited green vegetables, tomatoes, mushrooms, spaghetti squash, cauliflower, meat, poultry, seafood, eggs and hard cheeses.   -- Milk and plain yogurt  -- Dressings, seasonings, condiments, etc should have less than 2 g sugars.   -- Beans (1-1.5 cups) or nuts (1/4 cup): can have 1 x a day.   -- 1-2 servings of citrus fruits, berries, pineapple or melon a day (1/2 cup)  -- Avoid fried foods    *No grains, rice, pasta, potatoes, bread, corn, peas, oatmeal, grits, tortillas, crackers, chips    *No soda, sweet tea, juices or lemonade    Try www.dietdoctor.Tipzu for recipes (moderate carb intake)

## 2019-12-13 NOTE — PROGRESS NOTES
[facility] - Established Patient    Last Clinic Visit: 19 with Karen Lockhart NP     CHIEF COMPLAINT: Follow-up for elevated liver enzymes    HPI: This is a 39 y.o. Black or  female here for follow-up for elevated liver enzymes. Previously followed by Karen Lockhart NP and is a new patient to me.     She has had elevated transaminases since at least 2016, ALT>AST.  Initial serologic workup revealed (+) AMA at 1:640. IgG elevated [id number]. STEPH and ASMA (-). CK noted to be elevated, 300s.     Imaging showed hepatomegaly and steatosis.  Fibroscan 2016 and 2018: F0-F1 but significant steatosis (S3).  Her risk factors for ROBERTS - BMI 35 (no HTN, HLD, DM).     Liver biopsy 2018:  No findings of PBC  No ROBERTS though 10% macrosteatosis  No fibrosis    Interval history:  Transaminases remain elevated though weight is up >10 lb over the last 6 months. She noticed some weight gain after starting the Depo injections though she has been on this for a while. Liver enzymes had improved when weight was <200 lb this summer.     She also now has pre-diabetes, HgbA1c 5.9.     CK remains elevated, 300s. She is have some R gluteal pain that appears muscular.    Denies symptoms of hepatic decompensation including jaundice, ascites, cognitive problems to suggest hepatic encephalopathy, or GI bleeding.            Review of patient's allergies indicates:   Allergen Reactions    Bactrim [sulfamethoxazole-trimethoprim] Shortness Of Breath        No current outpatient medications on file prior to visit.     No current facility-administered medications on file prior to visit.          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    PSHX:  has a past surgical history that includes  section; Tonsillectomy; Biopsy of liver with ultrasound guidance (N/A, 6/15/2018); Liver biopsy (06/15/2018); and  Cholecystectomy (03/25/2019).    FAMILY HISTORY:   Family History   Problem Relation Age of Onset    Hypertension Mother     Hypertension Father     Hypertension Sister     No Known Problems Brother     Cirrhosis Neg Hx        SOCIAL HISTORY:   Social History     Tobacco Use   Smoking Status Never Smoker   Smokeless Tobacco Never Used       Social History     Substance and Sexual Activity   Alcohol Use No    Frequency: Never       Social History     Substance and Sexual Activity   Drug Use No         Review of Systems   Constitutional: Negative for appetite change, chills, fatigue, fever and unexpected weight change.   Eyes: Negative for visual disturbance.   Respiratory: Negative for cough and shortness of breath.    Cardiovascular: Negative for chest pain, palpitations and leg swelling.   Gastrointestinal: Negative for abdominal distention, abdominal pain, blood in stool, constipation, diarrhea, nausea and vomiting. No change in stool color.  Genitourinary: Negative for dysuria and hematuria. Denies dark urine.   Musculoskeletal: Negative for arthralgias, gait problem, joint swelling. R gluteal pain.  Skin: Negative for color change, rash and wound. Denies itching.   Neurological: Negative for dizziness, tremors, speech difficulty, and headaches.   Hematological: Does not bruise/bleed easily.   Psychiatric/Behavioral: Negative for confusion, decreased concentration and sleep disturbance. Denies memory loss. Denies depression. The patient is not nervous/anxious.        Physical Exam   Constitutional: Well-nourished. No distress. Alert and oriented to person, place, and time.  Eyes: No scleral icterus.   Cardiovascular: Normal rate, regular rhythm and normal heart sounds. No murmur heard.  Pulmonary/Chest: Respiratory effort and breath sounds normal. No respiratory distress.   Abdominal: Soft, non-tender. No distension; no ascites appreciated. There is no palpable hepatosplenomegaly. No hernia or mass.  "  Musculoskeletal: No edema.   Neurological: No tremor. Coordination and gait normal. No asterixis.    Skin: Skin is warm and dry. No rash or erythema. No jaundice. No telangiectasias or palmar erythema noted.  Psychiatric: Normal mood and affect. Speech, behavior, and thought content normal. No depression or anxiety noted.       BP (!) 129/94 (BP Location: Left arm, Patient Position: Sitting, BP Method: Medium (Automatic))   Pulse 96   Resp 18   Ht 5' 4" (1.626 m)   Wt 95 kg (209 lb 7 oz)   SpO2 98%   BMI 35.95 kg/m²       LABS:  Lab Results   Component Value Date    WBC 3.54 (L) 12/10/2019    HGB 13.4 12/10/2019    HCT 41.3 12/10/2019     12/10/2019     12/10/2019    K 3.6 12/10/2019    CREATININE 0.7 12/10/2019    ALT 95 (H) 12/10/2019    ALT 95 (H) 12/10/2019    AST 45 (H) 12/10/2019    AST 42 (H) 12/10/2019    ALKPHOS 89 12/10/2019    ALKPHOS 88 12/10/2019    BILITOT 0.2 12/10/2019    BILITOT 0.2 12/10/2019    BILIDIR 0.2 12/10/2019    ALBUMIN 4.1 12/10/2019    ALBUMIN 4.0 12/10/2019    INR 1.0 06/01/2018    AFP 7.5 06/09/2017    SMOOTHMUSCAB Negative 1:40 06/01/2018    AMAIFA Positive 1:640 (A) 06/01/2018    IGGSERUM 1676 (H) 06/01/2018    ANASCREEN Negative <1:160 06/01/2018    FERRITIN 100 02/02/2016    FESATURATED 15 (L) 02/02/2016    PETH Negative 06/06/2019    KICJZ1ZKZVDC MM 02/02/2016    CSBLX1GVFNML 108 02/02/2016    CERULOPLSM 26.0 02/02/2016    HGBA1C 5.9 (H) 12/10/2019       Hepatitis A Antibody IgG   Date Value Ref Range Status   02/02/2016 Positive (A)  Final       Hepatitis B Surface Ag   Date Value Ref Range Status   02/02/2016 Negative  Final     Hep B Core Total Ab   Date Value Ref Range Status   02/02/2016 Negative  Final     Hep B S Ab   Date Value Ref Range Status   02/02/2016 Positive (A)  Final     Hepatitis C Ab   Date Value Ref Range Status   02/02/2016 Negative  Final     Immunization History   Administered Date(s) Administered    DTP 1981, 1981, " 01/04/1982, 03/23/1983, 03/13/1986    Hepatitis B, Pediatric/Adolescent 11/10/1998, 11/04/1999, 12/15/1999, 01/09/2012    Influenza 08/26/2009, 01/09/2012, 10/04/2018    Influenza A (H1N1) 2009 Monovalent - IM - PF 10/22/2009    MMR 01/04/1983, 11/04/1999    OPV 1981, 1981, 01/04/1982, 03/23/1983, 03/13/1986    Td (ADULT) 06/26/1997, 03/06/2008    Td - PF (ADULT) 05/01/2019          DIAGNOSTIC STUDIES:  ABD. U/S  Results for orders placed during the hospital encounter of 08/31/18   US Abdomen Complete    Narrative EXAMINATION:  US ABDOMEN COMPLETE    CLINICAL HISTORY:  right upper quadrant pain, hx of NAFLD, with urgency and frequency; Unspecified abdominal pain    TECHNIQUE:  Complete abdominal ultrasound (including pancreas, aorta, liver, gallbladder, common bile duct, IVC, kidneys, and spleen) was performed.    COMPARISON:  None    FINDINGS:  Pancreas: The visualized portions of pancreas appear normal.    Aorta: No aneurysm.    Liver: 17.0 cm, enlarged Diffusely increased parenchymal echogenicity consistent with steatosis. no focal lesions.    Gallbladder: Gallstones are present. There is no gallbladder wall thickening or pericholecystic fluid.  No sonographic Kim's sign.    Biliary system: 3.6 mm common bile duct.  No intrahepatic ductal dilatation.    Inferior vena cava: Normal in appearance.    Right kidney: 10.3 cm. No hydronephrosis.    Left kidney: 10.2 cm. No hydronephrosis.    Spleen: 8.7 cm.  Normal in size with homogeneous echotexture.    Miscellaneous: No ascites.      Impression Hepatomegaly with increased echodensity of the liver secondary to fibrofatty changes as there is diminished penetration towards the deep surface.  There is no evidence of acute intra-abdominal findings.  Cholelithiasis.      Electronically signed by: Jackson Rodriguez MD  Date:    08/31/2018  Time:    14:06       Liver biopsy - 6/15/18  FINAL PATHOLOGIC DIAGNOSIS  LIVER, BIOPSY:  Minimal portal lymphocytic  inflammation  Macrovesicular steatosis - 10%  No evidence of steatohepatitis  No evidence of interface or lobular hepatitis  No evidence of increased fibrosis  No evidence of increased iron deposits  Biopsy shows minimal lymphocytic inflammation in the portal tracts. There are no granulomas. Patient's (limited) medical records have been reviewed and raised/positive AMA (anti-mitochondrial antibody) is noticed which raises the possibility of Primary biliary Cirrhosis. The findings in this biopsy are very minimal and non-specific. If clinical suspicion of PBC is high, it may represent either an early disease or sampling issues since primary biliary Cirrhosis is a patchy disease. Please correlate clinically. Routine stains trichrome and iron performed.        ASSESSMENT / PLAN:  39 y.o. Black or  female with:    1. Elevated liver enzymes - likely due to fatty liver  -- Liver enzymes have previously improved with weight loss. Likely more increased now as weight is up. Continue to monitor every 3-6 months.  -- Repeat Fibroscan at next visit to reassess fibrosis staging  -- Discussed importance of lifestyle modifications including healthy diet and adequate physical activity to achieve and maintain ideal body weight. 10% weight loss advised.   -- Low carbohydrate, low sugar diet.  -- Recommend control of risk factors including: pre-diabetes. No known HLD though will check lipid panel with next labs.      2. Positive AMA  -- Biopsy without findings of PBC and alk phos remains normal.  -- Continue to monitor liver tests. If alk phos becomes elevated or liver tests remain elevated in the setting of weight loss, can consider trial of ursodiol     3. Elevated CK  -- Etiology unclear. May not be clinically significant though she now has muscular pain in the R hip/gluteal region. Recommend f/u with PCP for further evaluation.      4. Body mass index is 35.95 kg/m².        Orders Placed This Encounter   Procedures     Hepatic function panel    Lipid panel    Hepatic function panel       Follow-up in 1 year with video visit.       EDUCATION / DISCUSSION:   We discussed the manifestations of fatty liver. At this time there is no FDA approved therapy for fatty liver. The patient and I discussed the importance of lifestyle modifications such as diet, exercise, and weight loss for management of fatty liver. We reviewed modification of risk factors such as hypertension, hyperlipidemia, and diabetes mellitus / impaired fasting glucose. Discussed that excessive alcohol consumption can also cause fatty liver. We discussed a low carb, low sugar diet and a goal of 30 minutes of exercise 5 times per week. Patient is aware that fatty liver can progress to steatohepatitis and possibly to cirrhosis, putting one at increased risk for liver cancer or liver failure.       Thank you for allowing me to participate in the care of Zeferino Pearson, JOHN-C  Hepatology and Liver Transplant

## 2020-01-05 ENCOUNTER — OFFICE VISIT (OUTPATIENT)
Dept: URGENT CARE | Facility: CLINIC | Age: 40
End: 2020-01-05
Payer: COMMERCIAL

## 2020-01-05 VITALS
WEIGHT: 207 LBS | OXYGEN SATURATION: 98 % | RESPIRATION RATE: 20 BRPM | SYSTOLIC BLOOD PRESSURE: 140 MMHG | HEART RATE: 81 BPM | TEMPERATURE: 98 F | DIASTOLIC BLOOD PRESSURE: 90 MMHG | BODY MASS INDEX: 35.34 KG/M2 | HEIGHT: 64 IN

## 2020-01-05 DIAGNOSIS — J06.9 VIRAL URI WITH COUGH: Primary | ICD-10-CM

## 2020-01-05 PROCEDURE — 96372 PR INJECTION,THERAP/PROPH/DIAG2ST, IM OR SUBCUT: ICD-10-PCS | Mod: S$GLB,,, | Performed by: PHYSICIAN ASSISTANT

## 2020-01-05 PROCEDURE — 99214 OFFICE O/P EST MOD 30 MIN: CPT | Mod: 25,S$GLB,, | Performed by: PHYSICIAN ASSISTANT

## 2020-01-05 PROCEDURE — 99214 PR OFFICE/OUTPT VISIT, EST, LEVL IV, 30-39 MIN: ICD-10-PCS | Mod: 25,S$GLB,, | Performed by: PHYSICIAN ASSISTANT

## 2020-01-05 PROCEDURE — 96372 THER/PROPH/DIAG INJ SC/IM: CPT | Mod: S$GLB,,, | Performed by: PHYSICIAN ASSISTANT

## 2020-01-05 RX ORDER — BROMPHENIRAMINE MALEATE, PSEUDOEPHEDRINE HYDROCHLORIDE, AND DEXTROMETHORPHAN HYDROBROMIDE 2; 30; 10 MG/5ML; MG/5ML; MG/5ML
10 SYRUP ORAL EVERY 6 HOURS PRN
Qty: 120 ML | Refills: 0 | Status: SHIPPED | OUTPATIENT
Start: 2020-01-05 | End: 2020-01-08

## 2020-01-05 RX ORDER — PREDNISONE 10 MG/1
10 TABLET ORAL DAILY
Qty: 4 TABLET | Refills: 0 | Status: SHIPPED | OUTPATIENT
Start: 2020-01-05 | End: 2020-01-09

## 2020-01-05 RX ORDER — MOMETASONE FUROATE 50 UG/1
2 SPRAY, METERED NASAL DAILY
Qty: 17 G | Refills: 0 | Status: SHIPPED | OUTPATIENT
Start: 2020-01-05 | End: 2022-05-09

## 2020-01-05 RX ORDER — DEXAMETHASONE SODIUM PHOSPHATE 100 MG/10ML
5 INJECTION INTRAMUSCULAR; INTRAVENOUS
Status: COMPLETED | OUTPATIENT
Start: 2020-01-05 | End: 2020-01-05

## 2020-01-05 RX ORDER — CHOLECALCIFEROL (VITAMIN D3) 25 MCG
1000 TABLET ORAL DAILY
COMMUNITY

## 2020-01-05 RX ADMIN — DEXAMETHASONE SODIUM PHOSPHATE 5 MG: 100 INJECTION INTRAMUSCULAR; INTRAVENOUS at 10:01

## 2020-01-05 NOTE — PATIENT INSTRUCTIONS
You have been diagnosed with a viral illness. Antibiotics will not help your infection to go away any faster.  You immune system must fight this illness.  You will likely have symptoms for 7-10 days as this is how long a typical virus lasts.  Below are a few things that you can do at home to help yourself feel better in the mean time.     1.  For patients above 6 months of age who are not allergic to and are not on anticoagulants, you can alternate Tylenol and Motrin every 4-6 hours for fever above 100.4F and/or pain.  For patients less than 6 months of age, allergic to or intolerant to NSAIDS, have gastritis, gastric ulcers, or history of GI bleeds, are pregnant, or are on anticoagulant therapy, you can take Tylenol every 4 hours as needed for fever above 100.4F and/or pain.     2.  Rest and keep yourself well hydrated.  Drink hot liquids (coffee, water, tea, hot chocolate, or soup) 10-12 times a day for 5-7 days.  Put liquid in a mug and place in microwave for 2.5 - 3 minutes. Pour hot liquid into another mug not used to microwave the liquid (to avoid burning your mouth) then sniff the steam from the cup and sip the heated liquid.    3.  You can use these over the counter medications/remedies to help with your symptoms:     Runny Nose:  Use an antihistamine such as Claritin, Zyrtec or Allegra to help dry you out.     Congestion:  Use pseudoephedrine (behind the counter) for congestion- Pseudoephedrine 30 mg up to 240 mg /day. Warning:  It can raise your blood pressure and give you palpitations.  Coricidin HBP is okay to use if you have high blood pressure.     Use mucinex (guaifenisin) up to 2400mg/day to break up/loosen any mucous. MucinexDM has a cough suppressant that can be used for cough and at night to stop the tickle in the back of your throat.    Use Nasal Saline to mechanically move any post nasal drip from your eustachian tubes or from the back of your throat.    Use Afrin in each nare, for no longer  than 3 days, as it is addictive. It can also dry out your mucous membranes and cause elevated blood pressure.    Use Flonase 1-2 sprays/nostril per day. It is a local acting steroid nasal spray.  If you develop a bloody nose, stop using the medication immediately.    Sore throat:  Use warm, salt water gargles to ease your throat pain- 1/2 tsp salt to 1 cup warm water, gargle as desired.  Chloraseptic sprays and throat lozenges will also help to ease throat pain.     Sometimes Nyquil at night is beneficial to help you get some rest; however, it is sedating and does contain an antihistamine and Tylenol.  Make sure not to double up on these medications.      These things will help you to feel better and will speed your recovery.  If your condition fails to improve in a timely manner, you should receive another evaluation by your Primary Care Provider/Pediatrician to discuss your concerns or return to urgent care for a recheck.  If your condition worsens at any time, you should report immediately to your nearest Emergency Department for further evaluation. **You must understand that you have received Urgent Care treatment only and that you may be released before all of your medical problems are known or treated. You, the patient, are responsible to arrange for follow-up care as instructed.             Viral Upper Respiratory Illness (Adult)  You have a viral upper respiratory illness (URI), which is another term for the common cold. This illness is contagious during the first few days. It is spread through the air by coughing and sneezing. It may also be spread by direct contact (touching the sick person and then touching your own eyes, nose, or mouth). Frequent handwashing will decrease risk of spread. Most viral illnesses go away within 7 to 10 days with rest and simple home remedies. Sometimes the illness may last for several weeks. Antibiotics will not kill a virus, and they are generally not prescribed for this  condition.    Home care  · If symptoms are severe, rest at home for the first 2 to 3 days. When you resume activity, don't let yourself get too tired.  · Avoid being exposed to cigarette smoke (yours or others).  · You may use acetaminophen or ibuprofen to control pain and fever, unless another medicine was prescribed. (Note: If you have chronic liver or kidney disease, have ever had a stomach ulcer or gastrointestinal bleeding, or are taking blood-thinning medicines, talk with your healthcare provider before using these medicines.) Aspirin should never be given to anyone under 18 years of age who is ill with a viral infection or fever. It may cause severe liver or brain damage.  · Your appetite may be poor, so a light diet is fine. Avoid dehydration by drinking 6 to 8 glasses of fluids per day (water, soft drinks, juices, tea, or soup). Extra fluids will help loosen secretions in the nose and lungs.  · Over-the-counter cold medicines will not shorten the length of time youre sick, but they may be helpful for the following symptoms: cough, sore throat, and nasal and sinus congestion. (Note: Do not use decongestants if you have high blood pressure.)  Follow-up care  Follow up with your healthcare provider, or as advised.  When to seek medical advice  Call your healthcare provider right away if any of these occur:  · Cough with lots of colored sputum (mucus)  · Severe headache; face, neck, or ear pain  · Difficulty swallowing due to throat pain  · Fever of 100.4°F (38°C)  Call 911, or get immediate medical care  Call emergency services right away if any of these occur:  · Chest pain, shortness of breath, wheezing, or difficulty breathing  · Coughing up blood  · Inability to swallow due to throat pain  Date Last Reviewed: 9/13/2015  © 7650-7196 [employer]. [address] [place] [address], [place] [address]. All rights reserved. This information is not intended as a substitute for professional medical care.  Always follow your healthcare professional's instructions.

## 2020-01-05 NOTE — PROGRESS NOTES
"Subjective:       Patient ID: Zeferino Moon is a 39 y.o. female.    Vitals:  height is 5' 4" (1.626 m) and weight is 93.9 kg (207 lb). Her oral temperature is 98.2 °F (36.8 °C). Her blood pressure is 140/90 (abnormal) and her pulse is 81. Her respiration is 20 and oxygen saturation is 98%.     Chief Complaint: Sinus Problem    Patient complains of a RN, congestion, PND, ear pain, cough, headache, and sinus pressure/pain for the past few weeks.  Patient denies fever and chills.  Patient also denies improvement with OTC meds and antibiotics that she completed a week ago.  Patient denies any other complaints at this time.       Sinus Problem   This is a new problem. The current episode started 1 to 4 weeks ago. The problem is unchanged. There has been no fever. Associated symptoms include congestion, coughing, ear pain (right), headaches, sinus pressure and sneezing. Pertinent negatives include no chills, diaphoresis, shortness of breath or sore throat. Past treatments include oral decongestants and antibiotics. The treatment provided mild relief.       Constitution: Negative for chills, sweating, fatigue and fever.   HENT: Positive for ear pain (right), congestion, postnasal drip and sinus pressure. Negative for sinus pain, sore throat and voice change.    Neck: Negative for painful lymph nodes.   Cardiovascular: Negative for chest pain.   Eyes: Negative for eye redness.   Respiratory: Positive for cough. Negative for chest tightness, sputum production, bloody sputum, COPD, shortness of breath, stridor, wheezing and asthma.    Gastrointestinal: Negative for abdominal pain, nausea, vomiting and diarrhea.   Musculoskeletal: Negative for muscle ache.   Skin: Negative for rash.   Allergic/Immunologic: Positive for sneezing. Negative for seasonal allergies and asthma.   Neurological: Positive for headaches.   Hematologic/Lymphatic: Negative for swollen lymph nodes.       Objective:      Physical Exam "   Constitutional: She is oriented to person, place, and time. She appears well-developed and well-nourished. She is cooperative.  Non-toxic appearance. She does not have a sickly appearance. She does not appear ill. No distress.   HENT:   Head: Normocephalic and atraumatic.   Right Ear: Hearing, tympanic membrane, external ear and ear canal normal.   Left Ear: Hearing, tympanic membrane, external ear and ear canal normal.   Nose: Mucosal edema (And enlarged, erythematous nasal turbinates noted) and rhinorrhea present. No sinus tenderness or nasal deformity. No epistaxis. Right sinus exhibits no maxillary sinus tenderness and no frontal sinus tenderness. Left sinus exhibits no maxillary sinus tenderness and no frontal sinus tenderness.   Mouth/Throat: Uvula is midline and mucous membranes are normal. No trismus in the jaw. Normal dentition. No uvula swelling. Posterior oropharyngeal erythema present. No oropharyngeal exudate or posterior oropharyngeal edema.   +PND   Eyes: Conjunctivae and lids are normal. No scleral icterus.   Neck: Trachea normal, full passive range of motion without pain and phonation normal. Neck supple. No neck rigidity. No edema and no erythema present.   Cardiovascular: Normal rate, regular rhythm, normal heart sounds, intact distal pulses and normal pulses.   Pulmonary/Chest: Effort normal and breath sounds normal. No respiratory distress. She has no decreased breath sounds. She has no rhonchi.   Abdominal: Normal appearance.   Musculoskeletal: Normal range of motion. She exhibits no edema or deformity.   Neurological: She is alert and oriented to person, place, and time. She exhibits normal muscle tone. Coordination normal.   Skin: Skin is warm, dry, intact, not diaphoretic and not pale.   Psychiatric: She has a normal mood and affect. Her speech is normal and behavior is normal. Judgment and thought content normal. Cognition and memory are normal.   Nursing note and vitals reviewed.         Assessment:       1. Viral URI with cough        Plan:         Viral URI with cough  -     dexamethasone injection 5 mg  -     predniSONE (DELTASONE) 10 MG tablet; Take 1 tablet (10 mg total) by mouth once daily. Start tomorrow. for 4 days  Dispense: 4 tablet; Refill: 0  -     mometasone (NASONEX) 50 mcg/actuation nasal spray; 2 sprays by Nasal route once daily.  Dispense: 17 g; Refill: 0  -     brompheniramine-pseudoeph-DM (BROMFED DM) 2-30-10 mg/5 mL Syrp; Take 10 mLs by mouth every 6 (six) hours as needed.  Dispense: 120 mL; Refill: 0      Patient Instructions   You have been diagnosed with a viral illness. Antibiotics will not help your infection to go away any faster.  You immune system must fight this illness.  You will likely have symptoms for 7-10 days as this is how long a typical virus lasts.  Below are a few things that you can do at home to help yourself feel better in the mean time.     1.  For patients above 6 months of age who are not allergic to and are not on anticoagulants, you can alternate Tylenol and Motrin every 4-6 hours for fever above 100.4F and/or pain.  For patients less than 6 months of age, allergic to or intolerant to NSAIDS, have gastritis, gastric ulcers, or history of GI bleeds, are pregnant, or are on anticoagulant therapy, you can take Tylenol every 4 hours as needed for fever above 100.4F and/or pain.     2.  Rest and keep yourself well hydrated.  Drink hot liquids (coffee, water, tea, hot chocolate, or soup) 10-12 times a day for 5-7 days.  Put liquid in a mug and place in microwave for 2.5 - 3 minutes. Pour hot liquid into another mug not used to microwave the liquid (to avoid burning your mouth) then sniff the steam from the cup and sip the heated liquid.    3.  You can use these over the counter medications/remedies to help with your symptoms:     Runny Nose:  Use an antihistamine such as Claritin, Zyrtec or Allegra to help dry you out.     Congestion:  Use pseudoephedrine  (behind the counter) for congestion- Pseudoephedrine 30 mg up to 240 mg /day. Warning:  It can raise your blood pressure and give you palpitations.  Coricidin HBP is okay to use if you have high blood pressure.     Use mucinex (guaifenisin) up to 2400mg/day to break up/loosen any mucous. MucinexDM has a cough suppressant that can be used for cough and at night to stop the tickle in the back of your throat.    Use Nasal Saline to mechanically move any post nasal drip from your eustachian tubes or from the back of your throat.    Use Afrin in each nare, for no longer than 3 days, as it is addictive. It can also dry out your mucous membranes and cause elevated blood pressure.    Use Flonase 1-2 sprays/nostril per day. It is a local acting steroid nasal spray.  If you develop a bloody nose, stop using the medication immediately.    Sore throat:  Use warm, salt water gargles to ease your throat pain- 1/2 tsp salt to 1 cup warm water, gargle as desired.  Chloraseptic sprays and throat lozenges will also help to ease throat pain.     Sometimes Nyquil at night is beneficial to help you get some rest; however, it is sedating and does contain an antihistamine and Tylenol.  Make sure not to double up on these medications.      These things will help you to feel better and will speed your recovery.  If your condition fails to improve in a timely manner, you should receive another evaluation by your Primary Care Provider/Pediatrician to discuss your concerns or return to urgent care for a recheck.  If your condition worsens at any time, you should report immediately to your nearest Emergency Department for further evaluation. **You must understand that you have received Urgent Care treatment only and that you may be released before all of your medical problems are known or treated. You, the patient, are responsible to arrange for follow-up care as instructed.             Viral Upper Respiratory Illness (Adult)  You have a viral  upper respiratory illness (URI), which is another term for the common cold. This illness is contagious during the first few days. It is spread through the air by coughing and sneezing. It may also be spread by direct contact (touching the sick person and then touching your own eyes, nose, or mouth). Frequent handwashing will decrease risk of spread. Most viral illnesses go away within 7 to 10 days with rest and simple home remedies. Sometimes the illness may last for several weeks. Antibiotics will not kill a virus, and they are generally not prescribed for this condition.    Home care  · If symptoms are severe, rest at home for the first 2 to 3 days. When you resume activity, don't let yourself get too tired.  · Avoid being exposed to cigarette smoke (yours or others).  · You may use acetaminophen or ibuprofen to control pain and fever, unless another medicine was prescribed. (Note: If you have chronic liver or kidney disease, have ever had a stomach ulcer or gastrointestinal bleeding, or are taking blood-thinning medicines, talk with your healthcare provider before using these medicines.) Aspirin should never be given to anyone under 18 years of age who is ill with a viral infection or fever. It may cause severe liver or brain damage.  · Your appetite may be poor, so a light diet is fine. Avoid dehydration by drinking 6 to 8 glasses of fluids per day (water, soft drinks, juices, tea, or soup). Extra fluids will help loosen secretions in the nose and lungs.  · Over-the-counter cold medicines will not shorten the length of time youre sick, but they may be helpful for the following symptoms: cough, sore throat, and nasal and sinus congestion. (Note: Do not use decongestants if you have high blood pressure.)  Follow-up care  Follow up with your healthcare provider, or as advised.  When to seek medical advice  Call your healthcare provider right away if any of these occur:  · Cough with lots of colored sputum  (mucus)  · Severe headache; face, neck, or ear pain  · Difficulty swallowing due to throat pain  · Fever of 100.4°F (38°C)  Call 911, or get immediate medical care  Call emergency services right away if any of these occur:  · Chest pain, shortness of breath, wheezing, or difficulty breathing  · Coughing up blood  · Inability to swallow due to throat pain  Date Last Reviewed: 9/13/2015  © 8219-8072 [employer] [address] [place] [address], [place] [address]. All rights reserved. This information is not intended as a substitute for professional medical care. Always follow your healthcare professional's instructions.

## 2020-03-18 ENCOUNTER — PATIENT MESSAGE (OUTPATIENT)
Dept: HEPATOLOGY | Facility: CLINIC | Age: 40
End: 2020-03-18

## 2020-04-30 DIAGNOSIS — Z01.84 ANTIBODY RESPONSE EXAMINATION: ICD-10-CM

## 2020-06-05 DIAGNOSIS — R74.8 ELEVATED LIVER ENZYMES: Primary | ICD-10-CM

## 2020-10-03 ENCOUNTER — OFFICE VISIT (OUTPATIENT)
Dept: URGENT CARE | Facility: CLINIC | Age: 40
End: 2020-10-03
Payer: MEDICAID

## 2020-10-03 VITALS
HEIGHT: 64 IN | SYSTOLIC BLOOD PRESSURE: 129 MMHG | RESPIRATION RATE: 17 BRPM | OXYGEN SATURATION: 98 % | WEIGHT: 204 LBS | HEART RATE: 108 BPM | BODY MASS INDEX: 34.83 KG/M2 | DIASTOLIC BLOOD PRESSURE: 72 MMHG | TEMPERATURE: 98 F

## 2020-10-03 DIAGNOSIS — L08.9 FINGER INFECTION: Primary | ICD-10-CM

## 2020-10-03 PROCEDURE — 99214 PR OFFICE/OUTPT VISIT, EST, LEVL IV, 30-39 MIN: ICD-10-PCS | Mod: S$GLB,,, | Performed by: NURSE PRACTITIONER

## 2020-10-03 PROCEDURE — 99214 OFFICE O/P EST MOD 30 MIN: CPT | Mod: S$GLB,,, | Performed by: NURSE PRACTITIONER

## 2020-10-03 RX ORDER — CLINDAMYCIN HYDROCHLORIDE 300 MG/1
300 CAPSULE ORAL EVERY 8 HOURS
Qty: 21 CAPSULE | Refills: 0 | Status: SHIPPED | OUTPATIENT
Start: 2020-10-03 | End: 2020-10-10

## 2020-10-03 RX ORDER — MUPIROCIN 20 MG/G
OINTMENT TOPICAL 3 TIMES DAILY
Qty: 2 G | Refills: 1 | Status: SHIPPED | OUTPATIENT
Start: 2020-10-03 | End: 2020-10-13

## 2020-10-03 NOTE — PROGRESS NOTES
"Subjective:       Patient ID: Zeferino Moon is a 40 y.o. female.    Vitals:  height is 5' 4" (1.626 m) and weight is 92.5 kg (204 lb). Her other (see comments) temperature is 97.5 °F (36.4 °C). Her blood pressure is 129/72 and her pulse is 108. Her respiration is 17 and oxygen saturation is 98%.     Chief Complaint: Laceration (left finger)    Patient was seen at \[facility]\"" and has been following their instructions, but feel like it is getting worse. Pt reports she had cut her left index finger one week ago. Pt denies being treated with antibiotics initially. Pt reports swelling and tenderness. No drainage. Pt reports she is UTD on Tdap vaccine.     Laceration   The incident occurred more than 1 week ago. The laceration is located on the left hand. The laceration mechanism was a dirty knife. The pain is at a severity of 5/10. The pain has been worsening since onset. She reports no foreign bodies present. Her tetanus status is UTD.       Constitution: Negative for fatigue.   HENT: Negative for facial swelling and facial trauma.    Neck: Negative for neck stiffness.   Cardiovascular: Negative for chest trauma.   Eyes: Negative for eye trauma, double vision and blurred vision.   Gastrointestinal: Negative for abdominal trauma, abdominal pain and rectal bleeding.   Genitourinary: Negative for hematuria, missed menses, genital trauma and pelvic pain.   Musculoskeletal: Negative for pain, trauma, joint swelling and abnormal ROM of joint.   Skin: Positive for laceration (repaired with glue at different clinic). Negative for color change, wound, abrasion, erythema and bruising.        tingling   Neurological: Negative for dizziness, history of vertigo, light-headedness, coordination disturbances, altered mental status and loss of consciousness.   Hematologic/Lymphatic: Negative for history of bleeding disorder.   Psychiatric/Behavioral: Negative for altered mental status.       Objective:      Physical Exam "   Constitutional: She is oriented to person, place, and time. She appears well-developed.   HENT:   Head: Normocephalic and atraumatic. Head is without abrasion, without contusion and without laceration.   Ears:   Right Ear: External ear normal.   Left Ear: External ear normal.   Nose: Nose normal.   Mouth/Throat: Oropharynx is clear and moist and mucous membranes are normal.   Eyes: Pupils are equal, round, and reactive to light. Conjunctivae, EOM and lids are normal.   Neck: Trachea normal, normal range of motion, full passive range of motion without pain and phonation normal. Neck supple.   Cardiovascular: Normal rate, regular rhythm and normal heart sounds.   Pulmonary/Chest: Effort normal and breath sounds normal. No stridor. No respiratory distress.   Musculoskeletal: Normal range of motion.        Hands:    Lymphadenopathy:     She has no cervical adenopathy.   Neurological: She is alert and oriented to person, place, and time.   Skin: Skin is warm, dry, intact and no rash. Capillary refill takes less than 2 seconds. abrasion, burn, bruising, erythema and ecchymosisPsychiatric: Her speech is normal and behavior is normal. Judgment and thought content normal.   Nursing note and vitals reviewed.        Assessment:       1. Finger infection        Plan:         Finger infection  -     mupirocin (BACTROBAN) 2 % ointment; Apply topically 3 (three) times daily. for 10 days  Dispense: 2 g; Refill: 1  -     clindamycin (CLEOCIN) 300 MG capsule; Take 1 capsule (300 mg total) by mouth every 8 (eight) hours. for 7 days  Dispense: 21 capsule; Refill: 0      Patient Instructions     1.  Take all medications as directed. If you have been prescribed antibiotics, make sure to complete them.   2.  Rest and keep yourself/patient well hydrated. For adults, it is recommended to drink at least 8-10 glasses of water daily.   3.  For patients above 6 months of age who are not allergic to and are not on anticoagulants, you can  alternate Tylenol and Motrin every 4-6 hours for fever above 100.4F and/or pain.  For patients less than 6 months of age, allergic to or intolerant to NSAIDS, have gastritis, gastric ulcers, or history of GI bleeds, are pregnant, or are on anticoagulant therapy, you can take Tylenol every 4 hours as needed for fever above 100.4F and/or pain.   4. You should schedule a follow-up appointment with your Primary Care Provider/Pediatrician for recheck in 2-3 days or as directed at this visit.   5.  If your condition fails to improve in a timely manner, you should receive another evaluation by your Primary Care Provider/Pediatrician to discuss your concerns or return to urgent care for a recheck.  If your condition worsens at any time, you should report immediately to your nearest Emergency Department for further evaluation. **You must understand that you have received Urgent Care treatment only and that you may be released before all of your medical problems are known or treated. You, the patient, are responsible to arrange for follow-up care as instructed.         Wound Care  Taking proper care of your wound will help it heal. Your healthcare provider may show you how to clean and dress the wound. He or she will also explain how to tell if the wound is healing normally. If you are unsure of how to take care of the wound, be sure to clarify what dressing to use and how often you should change the bandages. Here are the basic steps.     A wound that's not healing normally may be dark in color or have white streaks.   Wash your hands  Tips for washing your hands include:  · Use liquid soap and lather for 2 minutes. Scrub between your fingers and under your nails.  · Rinse with warm water, keeping your fingers pointing down.  · Use a paper towel to dry your hands and to turn off the faucet.  Remove the used dressing  Here are suggestions for removing the dressing:  · If dressing changes cause you pain, be sure to take your  pain medicine as prescribed by your healthcare provider 30 minutes before dressing changes.  · Set up your supplies.  · Put on disposable gloves if youre dressing a wound for someone else or your wound is infected.  · Loosen the tape by pulling gently toward the wound.  · Gently take off the old dressing. If the dressing is stuck to the wound, moisten it with saline (if available) or clean water.  · If you have a drain or tube in the wound, be careful not to pull on it.  · Remove the dressing 1 layer at a time and put it in a plastic bag.  · Remove your gloves.  Inspect and dress the wound  Check the wound carefully:  · Each time you change the dressing, inspect the wound carefully to be sure its healing normally by making sure your wound appears to be pink and moist, and is free of infection.    · Wash your hands again. Put on a new pair of gloves.  · Clean and dress the wound as directed by your healthcare provider or nurse. Do not put anything in the wound that is not prescribed or directed by your healthcare provider. If you have a drain or tube, be careful not to pull on it. Make sure to secure the drain or tube as well.  · Put all supplies in a plastic bag. Seal the bag and put it in the trash.  · Be sure to wash your hands again.  Call your healthcare provider  Call your healthcare provider if you see any of the following signs of a problem:  · Bleeding that soaks the dressing  · Pink fluid weeping from the wound  · Increased drainage or drainage that is yellow, yellow-green, or foul-smelling  · Increased swelling or pain, or redness or swelling in the skin around the wound  · A change in the color of the wound, or if streaks develop in a direction away from the wound  · The area between any stitches opens up  · An increase in the size of the wound  · A fever of 100.4°F (38°C) or higher, or as directed by your healthcare provider  · Chills, increased fatigue, or a loss of appetite      Date Last Reviewed:  7/30/2015  © 9274-3861 [employer] [address] [place] [address], [place] [address]. All rights reserved. This information is not intended as a substitute for professional medical care. Always follow your healthcare professional's instructions.

## 2020-12-03 ENCOUNTER — TELEPHONE (OUTPATIENT)
Dept: HEPATOLOGY | Facility: CLINIC | Age: 40
End: 2020-12-03

## 2020-12-03 NOTE — TELEPHONE ENCOUNTER
----- Message from Kristofer Soliman sent at 12/3/2020  9:37 AM CST -----  Regarding: Scheduling          Pt calling to reschedule her 12/7 & 12/8 appts. Preferably in Jan.                   [phone] (M)

## 2020-12-26 ENCOUNTER — OFFICE VISIT (OUTPATIENT)
Dept: URGENT CARE | Facility: CLINIC | Age: 40
End: 2020-12-26
Payer: MEDICAID

## 2020-12-26 VITALS
SYSTOLIC BLOOD PRESSURE: 131 MMHG | RESPIRATION RATE: 17 BRPM | DIASTOLIC BLOOD PRESSURE: 97 MMHG | WEIGHT: 204 LBS | HEIGHT: 64 IN | TEMPERATURE: 99 F | HEART RATE: 85 BPM | OXYGEN SATURATION: 99 % | BODY MASS INDEX: 34.83 KG/M2

## 2020-12-26 DIAGNOSIS — J06.9 URI WITH COUGH AND CONGESTION: ICD-10-CM

## 2020-12-26 DIAGNOSIS — Z11.59 ENCOUNTER FOR SCREENING FOR OTHER VIRAL DISEASES: Primary | ICD-10-CM

## 2020-12-26 DIAGNOSIS — R05.9 COUGH: ICD-10-CM

## 2020-12-26 LAB
CTP QC/QA: YES
SARS-COV-2 RDRP RESP QL NAA+PROBE: NEGATIVE

## 2020-12-26 PROCEDURE — 87635 SARS-COV-2 COVID-19 AMP PRB: CPT | Mod: QW,S$GLB,, | Performed by: PHYSICIAN ASSISTANT

## 2020-12-26 PROCEDURE — 99214 OFFICE O/P EST MOD 30 MIN: CPT | Mod: S$GLB,,, | Performed by: PHYSICIAN ASSISTANT

## 2020-12-26 PROCEDURE — 99214 PR OFFICE/OUTPT VISIT, EST, LEVL IV, 30-39 MIN: ICD-10-PCS | Mod: S$GLB,,, | Performed by: PHYSICIAN ASSISTANT

## 2020-12-26 PROCEDURE — 71046 XR CHEST PA AND LATERAL: ICD-10-PCS | Mod: S$GLB,,, | Performed by: RADIOLOGY

## 2020-12-26 PROCEDURE — 87635: ICD-10-PCS | Mod: QW,S$GLB,, | Performed by: PHYSICIAN ASSISTANT

## 2020-12-26 PROCEDURE — 71046 X-RAY EXAM CHEST 2 VIEWS: CPT | Mod: S$GLB,,, | Performed by: RADIOLOGY

## 2020-12-26 RX ORDER — FLUTICASONE PROPIONATE 50 MCG
1 SPRAY, SUSPENSION (ML) NASAL 2 TIMES DAILY PRN
Qty: 15 G | Refills: 0 | Status: SHIPPED | OUTPATIENT
Start: 2020-12-26 | End: 2021-06-16

## 2020-12-26 RX ORDER — PROMETHAZINE HYDROCHLORIDE AND DEXTROMETHORPHAN HYDROBROMIDE 6.25; 15 MG/5ML; MG/5ML
5 SYRUP ORAL EVERY 4 HOURS PRN
Qty: 180 ML | Refills: 0 | Status: SHIPPED | OUTPATIENT
Start: 2020-12-26 | End: 2021-01-06

## 2020-12-26 RX ORDER — CETIRIZINE HYDROCHLORIDE 10 MG/1
10 TABLET ORAL DAILY
Qty: 30 TABLET | Refills: 0 | Status: SHIPPED | OUTPATIENT
Start: 2020-12-26 | End: 2021-06-16

## 2020-12-26 RX ORDER — GUAIFENESIN 600 MG/1
1200 TABLET, EXTENDED RELEASE ORAL 2 TIMES DAILY
Qty: 40 TABLET | Refills: 0 | Status: SHIPPED | OUTPATIENT
Start: 2020-12-26 | End: 2021-01-06

## 2020-12-26 NOTE — PATIENT INSTRUCTIONS
Your test was NEGATIVE for COVID-19 (coronavirus).      You may leave home and/or return to work when the following conditions are met:  · 24 hours fever free without fever-reducing medications AND  · Improved symptoms  · You have not met the conditions of a close contact     What counts as a close contact?  · You were within 6 feet of someone who has COVID-19 for a total of 15 minutes or more (masked or unmasked).  · You provided care at home to someone who is sick with COVID-19.  · You had direct physical contact with the person (hugged or kissed them).  · You shared eating or drinking utensils.  · They sneezed, coughed, or somehow got respiratory droplets on you.     If you had a close contact:  · If possible, it is recommended that you quarantine for 14 days from the time of contact regardless of your test status.  · If you have no symptoms, quarantine may be stopped early at 10 days, but this carries a small risk of spreading the virus.  · If you have no symptoms and you have a negative COVID test on day 5 or later, quarantine may be stopped after day 7, but this also carries a small risk of spreading the virus.     Additional instructions:  · Social distance per your local guidelines  · Call ahead before visiting your doctor.  · Wear a mask when around others who do not live in your household.  · Cover your coughs and sneezes.  · Wash hands or use hand  often.      If your symptoms worsen or if you have any other concerns, please contact [facility] at [phone].     Sincerely,    Rebekah Dudley PA-C      Viral Upper Respiratory Illness (Adult)  You have a viral upper respiratory illness (URI), which is another term for the common cold. This illness is contagious during the first few days. It is spread through the air by coughing and sneezing. It may also be spread by direct contact (touching the sick person and then touching your own eyes, nose, or mouth). Frequent handwashing will  decrease risk of spread. Most viral illnesses go away within 7 to 10 days with rest and simple home remedies. Sometimes the illness may last for several weeks. Antibiotics will not kill a virus, and they are generally not prescribed for this condition.    Home care  · If symptoms are severe, rest at home for the first 2 to 3 days. When you resume activity, don't let yourself get too tired.  · Avoid being exposed to cigarette smoke (yours or others).  · You may use acetaminophen or ibuprofen to control pain and fever, unless another medicine was prescribed. (Note: If you have chronic liver or kidney disease, have ever had a stomach ulcer or gastrointestinal bleeding, or are taking blood-thinning medicines, talk with your healthcare provider before using these medicines.) Aspirin should never be given to anyone under 18 years of age who is ill with a viral infection or fever. It may cause severe liver or brain damage.  · Your appetite may be poor, so a light diet is fine. Avoid dehydration by drinking 6 to 8 glasses of fluids per day (water, soft drinks, juices, tea, or soup). Extra fluids will help loosen secretions in the nose and lungs.  · Over-the-counter cold medicines will not shorten the length of time youre sick, but they may be helpful for the following symptoms: cough, sore throat, and nasal and sinus congestion. (Note: Do not use decongestants if you have high blood pressure.)  Follow-up care  Follow up with your healthcare provider, or as advised.  When to seek medical advice  Call your healthcare provider right away if any of these occur:  · Cough with lots of colored sputum (mucus)  · Severe headache; face, neck, or ear pain  · Difficulty swallowing due to throat pain  · Fever of 100.4°F (38°C)  Call 911, or get immediate medical care  Call emergency services right away if any of these occur:  · Chest pain, shortness of breath, wheezing, or difficulty breathing  · Coughing up blood  · Inability to  swallow due to throat pain  Date Last Reviewed: 9/13/2015  © 1210-0375 [employer], [employer]. [address] [place] [address], [place] [address]. All rights reserved. This information is not intended as a substitute for professional medical care. Always follow your healthcare professional's instructions.

## 2020-12-26 NOTE — PROGRESS NOTES
"Subjective:       Patient ID: Zeferino Moon is a 40 y.o. female.    Vitals:  height is 5' 4" (1.626 m) and weight is 92.5 kg (204 lb). Her temperature is 98.8 °F (37.1 °C). Her blood pressure is 131/97 (abnormal) and her pulse is 85. Her respiration is 17 and oxygen saturation is 99%.     Chief Complaint: COVID-19 Concerns    Patient reports COVID exposure at work. 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 Denies fever. She reports symptoms have worsened since 12/22/2020 but admits that she has had a cough for 3 weeks. Denies SOB. Treated with robitussin with no relief    Sore Throat   This is a new problem. Episode onset: Tuesday 12/22/2020. The problem has been gradually worsening. Sore throat worse side: both. There has been no fever. The patient is experiencing no pain. Associated symptoms include congestion, coughing and ear pain (right ear ). Pertinent negatives include no abdominal pain, headaches, shortness of breath, stridor or vomiting. Treatments tried: robintussin  The treatment provided mild relief.       Constitution: Negative for activity change, appetite change, chills, sweating, fatigue and fever.   HENT: Positive for ear pain (right ear ), congestion, postnasal drip, sinus pain, sinus pressure and sore throat. Negative for voice change.    Neck: Negative for painful lymph nodes.   Eyes: Negative for eye redness.   Respiratory: Positive for cough. Negative for chest tightness, sputum production, bloody sputum, shortness of breath, stridor, wheezing and asthma.    Gastrointestinal: Negative for abdominal pain, nausea and vomiting.   Genitourinary: Negative.    Musculoskeletal: Negative for muscle ache.   Skin: Negative for rash.   Allergic/Immunologic: Positive for immunizations up-to-date and flu shot. Negative for seasonal allergies and asthma.   Neurological: Negative for headaches.   Hematologic/Lymphatic: Negative for swollen lymph nodes.     "   Objective:      Physical Exam   Constitutional: She is oriented to person, place, and time. She appears well-developed. She is cooperative.  Non-toxic appearance. She does not appear ill. No distress.   HENT:   Head: Normocephalic and atraumatic.   Ears:   Right Ear: Hearing, tympanic membrane, external ear and ear canal normal.   Left Ear: Hearing, tympanic membrane, external ear and ear canal normal.   Nose: Rhinorrhea and congestion present. No mucosal edema or nasal deformity. No epistaxis. Right sinus exhibits no maxillary sinus tenderness and no frontal sinus tenderness. Left sinus exhibits no maxillary sinus tenderness and no frontal sinus tenderness.   Mouth/Throat: Uvula is midline, oropharynx is clear and moist and mucous membranes are normal. Mucous membranes are moist. No trismus in the jaw. Normal dentition. No uvula swelling. No oropharyngeal exudate, posterior oropharyngeal edema or posterior oropharyngeal erythema. Oropharynx is clear.   Eyes: Conjunctivae and lids are normal. No scleral icterus.   Neck: Trachea normal, full passive range of motion without pain and phonation normal. Neck supple. No neck rigidity. No edema and no erythema present.   Cardiovascular: Normal rate, regular rhythm, normal heart sounds and normal pulses.   Pulmonary/Chest: Effort normal and breath sounds normal. No stridor. No respiratory distress. She has no decreased breath sounds. She has no wheezes. She has no rhonchi. She has no rales.   Abdominal: Normal appearance.   Musculoskeletal: Normal range of motion.         General: No deformity.   Neurological: She is alert and oriented to person, place, and time. She exhibits normal muscle tone. Coordination normal.   Skin: Skin is warm, dry, intact, not diaphoretic and not pale. Psychiatric: Her speech is normal and behavior is normal. Judgment and thought content normal.   Nursing note and vitals reviewed.        Assessment:       1. Encounter for screening for other  viral diseases    2. Cough    3. URI with cough and congestion        Plan:         Encounter for screening for other viral diseases  -     POCT COVID-19 Rapid Screening    Cough  -     XR CHEST PA AND LATERAL; Future; Expected date: 12/26/2020    URI with cough and congestion  -     promethazine-dextromethorphan (PROMETHAZINE-DM) 6.25-15 mg/5 mL Syrp; Take 5 mLs by mouth every 4 (four) hours as needed (cough).  Dispense: 180 mL; Refill: 0  -     fluticasone propionate (FLONASE) 50 mcg/actuation nasal spray; 1 spray (50 mcg total) by Each Nostril route 2 (two) times daily as needed.  Dispense: 15 g; Refill: 0  -     guaiFENesin (MUCINEX) 600 mg 12 hr tablet; Take 2 tablets (1,200 mg total) by mouth 2 (two) times daily. for 10 days  Dispense: 40 tablet; Refill: 0  -     cetirizine (ZYRTEC) 10 MG tablet; Take 1 tablet (10 mg total) by mouth once daily.  Dispense: 30 tablet; Refill: 0      Results for orders placed or performed in visit on 12/26/20   POCT COVID-19 Rapid Screening   Result Value Ref Range    POC Rapid COVID Negative Negative     Acceptable Yes      Xr Chest Pa And Lateral    Result Date: 12/26/2020  EXAMINATION: XR CHEST PA AND LATERAL CLINICAL HISTORY: Cough TECHNIQUE: PA and lateral views of the chest were performed. COMPARISON: 09/27/2018 FINDINGS: The cardiomediastinal silhouette is not enlarged.  There is no pleural effusion.  The trachea is midline.  The lungs are symmetrically expanded bilaterally without evidence of acute parenchymal process. No large focal consolidation seen.  There is no pneumothorax.  The osseous structures are unremarkable.     1. No acute cardiopulmonary process. Electronically signed by: Yves Ledbetter MD Date:    12/26/2020 Time:    12:45    Imaging reviewed by me in PACS     Patient Instructions   Your test was NEGATIVE for COVID-19 (coronavirus).      You may leave home and/or return to work when the following conditions are met:  · 24 hours fever free  without fever-reducing medications AND  · Improved symptoms  · You have not met the conditions of a close contact     What counts as a close contact?  · You were within 6 feet of someone who has COVID-19 for a total of 15 minutes or more (masked or unmasked).  · You provided care at home to someone who is sick with COVID-19.  · You had direct physical contact with the person (hugged or kissed them).  · You shared eating or drinking utensils.  · They sneezed, coughed, or somehow got respiratory droplets on you.     If you had a close contact:  · If possible, it is recommended that you quarantine for 14 days from the time of contact regardless of your test status.  · If you have no symptoms, quarantine may be stopped early at 10 days, but this carries a small risk of spreading the virus.  · If you have no symptoms and you have a negative COVID test on day 5 or later, quarantine may be stopped after day 7, but this also carries a small risk of spreading the virus.     Additional instructions:  · Social distance per your local guidelines  · Call ahead before visiting your doctor.  · Wear a mask when around others who do not live in your household.  · Cover your coughs and sneezes.  · Wash hands or use hand  often.      If your symptoms worsen or if you have any other concerns, please contact [facility] at [phone].     Sincerely,    Rebekah Dudley PA-C      Viral Upper Respiratory Illness (Adult)  You have a viral upper respiratory illness (URI), which is another term for the common cold. This illness is contagious during the first few days. It is spread through the air by coughing and sneezing. It may also be spread by direct contact (touching the sick person and then touching your own eyes, nose, or mouth). Frequent handwashing will decrease risk of spread. Most viral illnesses go away within 7 to 10 days with rest and simple home remedies. Sometimes the illness may last for several weeks.  Antibiotics will not kill a virus, and they are generally not prescribed for this condition.    Home care  · If symptoms are severe, rest at home for the first 2 to 3 days. When you resume activity, don't let yourself get too tired.  · Avoid being exposed to cigarette smoke (yours or others).  · You may use acetaminophen or ibuprofen to control pain and fever, unless another medicine was prescribed. (Note: If you have chronic liver or kidney disease, have ever had a stomach ulcer or gastrointestinal bleeding, or are taking blood-thinning medicines, talk with your healthcare provider before using these medicines.) Aspirin should never be given to anyone under 18 years of age who is ill with a viral infection or fever. It may cause severe liver or brain damage.  · Your appetite may be poor, so a light diet is fine. Avoid dehydration by drinking 6 to 8 glasses of fluids per day (water, soft drinks, juices, tea, or soup). Extra fluids will help loosen secretions in the nose and lungs.  · Over-the-counter cold medicines will not shorten the length of time youre sick, but they may be helpful for the following symptoms: cough, sore throat, and nasal and sinus congestion. (Note: Do not use decongestants if you have high blood pressure.)  Follow-up care  Follow up with your healthcare provider, or as advised.  When to seek medical advice  Call your healthcare provider right away if any of these occur:  · Cough with lots of colored sputum (mucus)  · Severe headache; face, neck, or ear pain  · Difficulty swallowing due to throat pain  · Fever of 100.4°F (38°C)  Call 911, or get immediate medical care  Call emergency services right away if any of these occur:  · Chest pain, shortness of breath, wheezing, or difficulty breathing  · Coughing up blood  · Inability to swallow due to throat pain  Date Last Reviewed: 9/13/2015  © 2675-8819 [employer]. [address] [place] [address], [place] [address]. All rights reserved.  This information is not intended as a substitute for professional medical care. Always follow your healthcare professional's instructions.

## 2020-12-26 NOTE — LETTER
[address] [facility], [facility] ? [place], [address] ? Phone [phone] ? Fax [phone]           Return to Work/School    Patient: Zeferino Moon  YOB: 1980   Date: 12/26/2020      To Whom It May Concern:     Zeferino Moon was in contact with/seen in my office on 12/26/2020. COVID-19 is present in our communities across the [facility]. Not all patients are eligible or appropriate to be tested. In this situation, your employee meets the following criteria:     Zeferino Moon has met the criteria for COVID-19 testing and has a NEGATIVE result. The employee can return to work once they are asymptomatic for 24 hours without the use of fever reducing medications (Tylenol, Motrin, etc).     If you have any questions or concerns, or if I can be of further assistance, please do not hesitate to contact me.     Sincerely,    Rebekah Dudley PA-C

## 2021-01-06 ENCOUNTER — OFFICE VISIT (OUTPATIENT)
Dept: HEPATOLOGY | Facility: CLINIC | Age: 41
End: 2021-01-06
Payer: MEDICAID

## 2021-01-06 ENCOUNTER — PROCEDURE VISIT (OUTPATIENT)
Dept: HEPATOLOGY | Facility: CLINIC | Age: 41
End: 2021-01-06
Payer: MEDICAID

## 2021-01-06 VITALS
TEMPERATURE: 97 F | OXYGEN SATURATION: 100 % | RESPIRATION RATE: 18 BRPM | WEIGHT: 207 LBS | HEART RATE: 97 BPM | DIASTOLIC BLOOD PRESSURE: 74 MMHG | SYSTOLIC BLOOD PRESSURE: 127 MMHG | BODY MASS INDEX: 35.34 KG/M2 | HEIGHT: 64 IN

## 2021-01-06 DIAGNOSIS — K76.0 NAFLD (NONALCOHOLIC FATTY LIVER DISEASE): Primary | ICD-10-CM

## 2021-01-06 DIAGNOSIS — E66.09 CLASS 2 OBESITY DUE TO EXCESS CALORIES WITH BODY MASS INDEX (BMI) OF 35.0 TO 35.9 IN ADULT, UNSPECIFIED WHETHER SERIOUS COMORBIDITY PRESENT: ICD-10-CM

## 2021-01-06 DIAGNOSIS — R76.8 ANTIMITOCHONDRIAL ANTIBODY POSITIVE: ICD-10-CM

## 2021-01-06 DIAGNOSIS — K76.0 NAFLD (NONALCOHOLIC FATTY LIVER DISEASE): ICD-10-CM

## 2021-01-06 DIAGNOSIS — R74.8 ELEVATED LIVER ENZYMES: ICD-10-CM

## 2021-01-06 DIAGNOSIS — R73.03 PRE-DIABETES: ICD-10-CM

## 2021-01-06 PROCEDURE — 99999 PR PBB SHADOW E&M-EST. PATIENT-LVL IV: CPT | Mod: PBBFAC,,, | Performed by: NURSE PRACTITIONER

## 2021-01-06 PROCEDURE — 91200 LIVER ELASTOGRAPHY: CPT | Mod: 26,S$PBB,, | Performed by: NURSE PRACTITIONER

## 2021-01-06 PROCEDURE — 99214 PR OFFICE/OUTPT VISIT, EST, LEVL IV, 30-39 MIN: ICD-10-PCS | Mod: S$PBB,,, | Performed by: NURSE PRACTITIONER

## 2021-01-06 PROCEDURE — 99214 OFFICE O/P EST MOD 30 MIN: CPT | Mod: PBBFAC | Performed by: NURSE PRACTITIONER

## 2021-01-06 PROCEDURE — 91200 FIBROSCAN (VIBRATION CONTROLLED TRANSIENT ELASTOGRAPHY): ICD-10-PCS | Mod: 26,S$PBB,, | Performed by: NURSE PRACTITIONER

## 2021-01-06 PROCEDURE — 99999 PR PBB SHADOW E&M-EST. PATIENT-LVL IV: ICD-10-PCS | Mod: PBBFAC,,, | Performed by: NURSE PRACTITIONER

## 2021-01-06 PROCEDURE — 91200 LIVER ELASTOGRAPHY: CPT | Mod: PBBFAC | Performed by: NURSE PRACTITIONER

## 2021-01-06 PROCEDURE — 99214 OFFICE O/P EST MOD 30 MIN: CPT | Mod: S$PBB,,, | Performed by: NURSE PRACTITIONER

## 2021-01-06 RX ORDER — VALACYCLOVIR HYDROCHLORIDE 1 G/1
1000 TABLET, FILM COATED ORAL 2 TIMES DAILY
COMMUNITY
Start: 2020-12-18 | End: 2022-05-09

## 2021-05-06 ENCOUNTER — PATIENT MESSAGE (OUTPATIENT)
Dept: RESEARCH | Facility: HOSPITAL | Age: 41
End: 2021-05-06

## 2021-05-10 ENCOUNTER — PATIENT MESSAGE (OUTPATIENT)
Dept: RESEARCH | Facility: HOSPITAL | Age: 41
End: 2021-05-10

## 2021-06-16 ENCOUNTER — OFFICE VISIT (OUTPATIENT)
Dept: URGENT CARE | Facility: CLINIC | Age: 41
End: 2021-06-16
Payer: MEDICAID

## 2021-06-16 VITALS
BODY MASS INDEX: 34.33 KG/M2 | SYSTOLIC BLOOD PRESSURE: 130 MMHG | TEMPERATURE: 98 F | HEART RATE: 100 BPM | OXYGEN SATURATION: 98 % | DIASTOLIC BLOOD PRESSURE: 82 MMHG | WEIGHT: 200 LBS

## 2021-06-16 DIAGNOSIS — Z11.59 SCREENING FOR VIRAL DISEASE: Primary | ICD-10-CM

## 2021-06-16 DIAGNOSIS — R51.9 SINUS HEADACHE: ICD-10-CM

## 2021-06-16 DIAGNOSIS — J32.9 RHINOSINUSITIS: ICD-10-CM

## 2021-06-16 LAB
CTP QC/QA: YES
SARS-COV-2 RDRP RESP QL NAA+PROBE: NEGATIVE

## 2021-06-16 PROCEDURE — 99214 OFFICE O/P EST MOD 30 MIN: CPT | Mod: S$GLB,,, | Performed by: PHYSICIAN ASSISTANT

## 2021-06-16 PROCEDURE — U0002 COVID-19 LAB TEST NON-CDC: HCPCS | Mod: QW,S$GLB,, | Performed by: PHYSICIAN ASSISTANT

## 2021-06-16 PROCEDURE — U0002: ICD-10-PCS | Mod: QW,S$GLB,, | Performed by: PHYSICIAN ASSISTANT

## 2021-06-16 PROCEDURE — 99214 PR OFFICE/OUTPT VISIT, EST, LEVL IV, 30-39 MIN: ICD-10-PCS | Mod: S$GLB,,, | Performed by: PHYSICIAN ASSISTANT

## 2021-06-16 RX ORDER — FLUTICASONE PROPIONATE 50 MCG
1 SPRAY, SUSPENSION (ML) NASAL 2 TIMES DAILY PRN
Qty: 15 G | Refills: 0 | Status: SHIPPED | OUTPATIENT
Start: 2021-06-16

## 2021-06-16 RX ORDER — CETIRIZINE HYDROCHLORIDE 10 MG/1
10 TABLET ORAL DAILY
Qty: 30 TABLET | Refills: 0 | Status: SHIPPED | OUTPATIENT
Start: 2021-06-16 | End: 2023-03-10

## 2021-06-16 RX ORDER — PREDNISONE 20 MG/1
20 TABLET ORAL DAILY
Qty: 5 TABLET | Refills: 0 | Status: SHIPPED | OUTPATIENT
Start: 2021-06-16 | End: 2021-06-21

## 2021-06-16 RX ORDER — GUAIFENESIN AND DEXTROMETHORPHAN HYDROBROMIDE 600; 30 MG/1; MG/1
2 TABLET, EXTENDED RELEASE ORAL 2 TIMES DAILY PRN
Qty: 30 TABLET | Refills: 0 | Status: SHIPPED | OUTPATIENT
Start: 2021-06-16 | End: 2021-06-26

## 2022-02-04 PROBLEM — R07.9 CHEST PAIN: Status: ACTIVE | Noted: 2022-02-04

## 2022-05-18 ENCOUNTER — CLINICAL SUPPORT (OUTPATIENT)
Dept: URGENT CARE | Facility: CLINIC | Age: 42
End: 2022-05-18
Payer: MEDICAID

## 2022-05-18 DIAGNOSIS — Z20.822 ENCOUNTER FOR LABORATORY TESTING FOR COVID-19 VIRUS: ICD-10-CM

## 2022-05-18 PROCEDURE — U0005 INFEC AGEN DETEC AMPLI PROBE: HCPCS | Performed by: FAMILY MEDICINE

## 2022-05-18 PROCEDURE — U0003 INFECTIOUS AGENT DETECTION BY NUCLEIC ACID (DNA OR RNA); SEVERE ACUTE RESPIRATORY SYNDROME CORONAVIRUS 2 (SARS-COV-2) (CORONAVIRUS DISEASE [COVID-19]), AMPLIFIED PROBE TECHNIQUE, MAKING USE OF HIGH THROUGHPUT TECHNOLOGIES AS DESCRIBED BY CMS-2020-01-R: HCPCS | Performed by: FAMILY MEDICINE

## 2022-05-19 LAB — SARS-COV-2 RNA RESP QL NAA+PROBE: NOT DETECTED

## 2022-10-08 ENCOUNTER — OFFICE VISIT (OUTPATIENT)
Dept: URGENT CARE | Facility: CLINIC | Age: 42
End: 2022-10-08
Payer: MEDICAID

## 2022-10-08 VITALS
OXYGEN SATURATION: 99 % | SYSTOLIC BLOOD PRESSURE: 141 MMHG | WEIGHT: 204.63 LBS | DIASTOLIC BLOOD PRESSURE: 91 MMHG | HEIGHT: 64 IN | HEART RATE: 93 BPM | BODY MASS INDEX: 34.94 KG/M2 | RESPIRATION RATE: 18 BRPM | TEMPERATURE: 98 F

## 2022-10-08 DIAGNOSIS — M25.552 LEFT HIP PAIN: Primary | ICD-10-CM

## 2022-10-08 PROCEDURE — 1159F MED LIST DOCD IN RCRD: CPT | Mod: CPTII,S$GLB,, | Performed by: PHYSICIAN ASSISTANT

## 2022-10-08 PROCEDURE — 3008F BODY MASS INDEX DOCD: CPT | Mod: CPTII,S$GLB,, | Performed by: PHYSICIAN ASSISTANT

## 2022-10-08 PROCEDURE — 1159F PR MEDICATION LIST DOCUMENTED IN MEDICAL RECORD: ICD-10-PCS | Mod: CPTII,S$GLB,, | Performed by: PHYSICIAN ASSISTANT

## 2022-10-08 PROCEDURE — 3008F PR BODY MASS INDEX (BMI) DOCUMENTED: ICD-10-PCS | Mod: CPTII,S$GLB,, | Performed by: PHYSICIAN ASSISTANT

## 2022-10-08 PROCEDURE — 1160F PR REVIEW ALL MEDS BY PRESCRIBER/CLIN PHARMACIST DOCUMENTED: ICD-10-PCS | Mod: CPTII,S$GLB,, | Performed by: PHYSICIAN ASSISTANT

## 2022-10-08 PROCEDURE — 99214 PR OFFICE/OUTPT VISIT, EST, LEVL IV, 30-39 MIN: ICD-10-PCS | Mod: S$GLB,,, | Performed by: PHYSICIAN ASSISTANT

## 2022-10-08 PROCEDURE — 3077F PR MOST RECENT SYSTOLIC BLOOD PRESSURE >= 140 MM HG: ICD-10-PCS | Mod: CPTII,S$GLB,, | Performed by: PHYSICIAN ASSISTANT

## 2022-10-08 PROCEDURE — 3080F PR MOST RECENT DIASTOLIC BLOOD PRESSURE >= 90 MM HG: ICD-10-PCS | Mod: CPTII,S$GLB,, | Performed by: PHYSICIAN ASSISTANT

## 2022-10-08 PROCEDURE — 1160F RVW MEDS BY RX/DR IN RCRD: CPT | Mod: CPTII,S$GLB,, | Performed by: PHYSICIAN ASSISTANT

## 2022-10-08 PROCEDURE — 3077F SYST BP >= 140 MM HG: CPT | Mod: CPTII,S$GLB,, | Performed by: PHYSICIAN ASSISTANT

## 2022-10-08 PROCEDURE — 3080F DIAST BP >= 90 MM HG: CPT | Mod: CPTII,S$GLB,, | Performed by: PHYSICIAN ASSISTANT

## 2022-10-08 PROCEDURE — 99214 OFFICE O/P EST MOD 30 MIN: CPT | Mod: S$GLB,,, | Performed by: PHYSICIAN ASSISTANT

## 2022-10-08 RX ORDER — CYCLOBENZAPRINE HCL 10 MG
10 TABLET ORAL 3 TIMES DAILY PRN
Qty: 15 TABLET | Refills: 0 | Status: SHIPPED | OUTPATIENT
Start: 2022-10-08 | End: 2022-10-13

## 2022-10-08 RX ORDER — NAPROXEN 500 MG/1
500 TABLET ORAL 2 TIMES DAILY WITH MEALS
Qty: 20 TABLET | Refills: 0 | Status: SHIPPED | OUTPATIENT
Start: 2022-10-08 | End: 2023-04-06 | Stop reason: ALTCHOICE

## 2022-10-08 NOTE — PATIENT INSTRUCTIONS
You must understand that you have received treatment at an Urgent Care facility only, and that you may be  released before all of your medical problems are known or treated. Urgent Care facilities are not equipped to  handle life threatening emergencies. It is recommended that you seek care at an Emergency Department for  further evaluation of worsening or concerning symptoms, or possibly life threatening conditions as  discussed.

## 2022-10-08 NOTE — PROGRESS NOTES
"Subjective:       Patient ID: Zeferino Moon is a 42 y.o. female.    Vitals:  height is 5' 4" (1.626 m) and weight is 92.8 kg (204 lb 9.6 oz). Her oral temperature is 98.4 °F (36.9 °C). Her blood pressure is 141/91 (abnormal) and her pulse is 93. Her respiration is 18 and oxygen saturation is 99%.     Chief Complaint: Leg Pain    Reports pain to left hip. Denies trauma or injury. Denies radiation, numbness or weakness. Treated with ibuprofen with no relief. Denies loss of bowel or bladder function or saddle paresthesias.     Leg Pain   The incident occurred more than 1 week ago. The incident occurred at home. There was no injury mechanism. The pain is present in the left leg (upper leg). The quality of the pain is described as aching and stabbing. The pain is at a severity of 7/10. The pain is moderate. The pain has been Constant since onset. She reports no foreign bodies present. The symptoms are aggravated by movement. Treatments tried: ibuprofen. The treatment provided no relief.     Musculoskeletal:  Positive for pain and joint pain. Negative for trauma.        Left leg pain     Objective:      Physical Exam   Constitutional: She is oriented to person, place, and time. She appears well-developed. She is cooperative.  Non-toxic appearance. She does not appear ill. No distress.   HENT:   Head: Normocephalic and atraumatic.   Ears:   Right Ear: Hearing normal.   Left Ear: Hearing normal.   Eyes: Conjunctivae and lids are normal. No scleral icterus.   Neck: Phonation normal. Neck supple.   Cardiovascular: Normal rate.   Pulmonary/Chest: Effort normal. No respiratory distress.   Abdominal: Normal appearance. There is no left CVA tenderness and no right CVA tenderness.   Musculoskeletal:      Comments: No midline TTP or step offs to cervical, thoracic or lumbar spine. No paraspinal muscle TTP. FROM of spine without discomfort or pain. No signs of trauma or injury.   Full range of motion bilateral upper and " lower extremities. Strength 5/5.  Intact distal pulses with no sensory deficits.  Capillary refill less than 3 sec.  No signs of trauma or injury. No ecchymosis, edema, erythema, abrasions or lacerations.  Reports pain in left hip joint, ambulatory with no obvious signs of discomfort in clinic   Neurological: She is alert and oriented to person, place, and time. Coordination normal.   Skin: Skin is intact, not diaphoretic and not pale.   Psychiatric: Her speech is normal and behavior is normal. Judgment and thought content normal.   Nursing note and vitals reviewed.      Assessment:       1. Left hip pain          Plan:         Left hip pain  -     naproxen (NAPROSYN) 500 MG tablet; Take 1 tablet (500 mg total) by mouth 2 (two) times daily with meals.  Dispense: 20 tablet; Refill: 0  -     cyclobenzaprine (FLEXERIL) 10 MG tablet; Take 1 tablet (10 mg total) by mouth 3 (three) times daily as needed (hip pain).  Dispense: 15 tablet; Refill: 0       Bursitis vs sciatica most likely. Denies trauma or injury, do not suspect fracture or dislocation. Discussed septic joint vs AVN. Patient does not have risk factors or signs/symptoms of this. Also discussed possible arthritis as cause. Patient informed that no xray available today. Rest, ice, take meds as prescribed. Return PRN. If pain worsens/does not improve, recommend imaging. Discussed with patient the importance of f/u with their primary care provider. Urged to go to the ER for any worsening signs or symptoms.

## 2023-03-10 ENCOUNTER — PATIENT MESSAGE (OUTPATIENT)
Dept: HEPATOLOGY | Facility: CLINIC | Age: 43
End: 2023-03-10

## 2023-03-10 ENCOUNTER — LAB VISIT (OUTPATIENT)
Dept: LAB | Facility: HOSPITAL | Age: 43
End: 2023-03-10
Payer: COMMERCIAL

## 2023-03-10 ENCOUNTER — OFFICE VISIT (OUTPATIENT)
Dept: HEPATOLOGY | Facility: CLINIC | Age: 43
End: 2023-03-10
Payer: COMMERCIAL

## 2023-03-10 ENCOUNTER — PROCEDURE VISIT (OUTPATIENT)
Dept: HEPATOLOGY | Facility: CLINIC | Age: 43
End: 2023-03-10
Payer: COMMERCIAL

## 2023-03-10 VITALS — HEIGHT: 64 IN | BODY MASS INDEX: 35.98 KG/M2 | WEIGHT: 210.75 LBS

## 2023-03-10 DIAGNOSIS — R74.8 ELEVATED LIVER ENZYMES: ICD-10-CM

## 2023-03-10 DIAGNOSIS — E66.09 CLASS 2 OBESITY DUE TO EXCESS CALORIES WITH BODY MASS INDEX (BMI) OF 35.0 TO 35.9 IN ADULT, UNSPECIFIED WHETHER SERIOUS COMORBIDITY PRESENT: ICD-10-CM

## 2023-03-10 DIAGNOSIS — K76.0 NAFLD (NONALCOHOLIC FATTY LIVER DISEASE): ICD-10-CM

## 2023-03-10 DIAGNOSIS — K76.0 NAFLD (NONALCOHOLIC FATTY LIVER DISEASE): Primary | ICD-10-CM

## 2023-03-10 DIAGNOSIS — R76.8 ANTIMITOCHONDRIAL ANTIBODY POSITIVE: ICD-10-CM

## 2023-03-10 DIAGNOSIS — R73.03 PRE-DIABETES: ICD-10-CM

## 2023-03-10 LAB
ALBUMIN SERPL BCP-MCNC: 3.8 G/DL (ref 3.5–5.2)
ALP SERPL-CCNC: 82 U/L (ref 55–135)
ALT SERPL W/O P-5'-P-CCNC: 51 U/L (ref 10–44)
ANION GAP SERPL CALC-SCNC: 7 MMOL/L (ref 8–16)
AST SERPL-CCNC: 26 U/L (ref 10–40)
BILIRUB SERPL-MCNC: 0.4 MG/DL (ref 0.1–1)
BUN SERPL-MCNC: 9 MG/DL (ref 6–20)
CALCIUM SERPL-MCNC: 9.4 MG/DL (ref 8.7–10.5)
CHLORIDE SERPL-SCNC: 104 MMOL/L (ref 95–110)
CHOLEST SERPL-MCNC: 183 MG/DL (ref 120–199)
CHOLEST/HDLC SERPL: 2.7 {RATIO} (ref 2–5)
CO2 SERPL-SCNC: 27 MMOL/L (ref 23–29)
CREAT SERPL-MCNC: 0.8 MG/DL (ref 0.5–1.4)
ERYTHROCYTE [DISTWIDTH] IN BLOOD BY AUTOMATED COUNT: 14.4 % (ref 11.5–14.5)
EST. GFR  (NO RACE VARIABLE): >60 ML/MIN/1.73 M^2
ESTIMATED AVG GLUCOSE: 120 MG/DL (ref 68–131)
GLUCOSE SERPL-MCNC: 82 MG/DL (ref 70–110)
HBA1C MFR BLD: 5.8 % (ref 4–5.6)
HCT VFR BLD AUTO: 43.2 % (ref 37–48.5)
HDLC SERPL-MCNC: 67 MG/DL (ref 40–75)
HDLC SERPL: 36.6 % (ref 20–50)
HGB BLD-MCNC: 13.6 G/DL (ref 12–16)
LDLC SERPL CALC-MCNC: 105.4 MG/DL (ref 63–159)
MCH RBC QN AUTO: 29.2 PG (ref 27–31)
MCHC RBC AUTO-ENTMCNC: 31.5 G/DL (ref 32–36)
MCV RBC AUTO: 93 FL (ref 82–98)
NONHDLC SERPL-MCNC: 116 MG/DL
PLATELET # BLD AUTO: 268 K/UL (ref 150–450)
PMV BLD AUTO: 9.9 FL (ref 9.2–12.9)
POTASSIUM SERPL-SCNC: 3.9 MMOL/L (ref 3.5–5.1)
PROT SERPL-MCNC: 7.9 G/DL (ref 6–8.4)
RBC # BLD AUTO: 4.66 M/UL (ref 4–5.4)
SODIUM SERPL-SCNC: 138 MMOL/L (ref 136–145)
TRIGL SERPL-MCNC: 53 MG/DL (ref 30–150)
WBC # BLD AUTO: 3.3 K/UL (ref 3.9–12.7)

## 2023-03-10 PROCEDURE — 99999 PR PBB SHADOW E&M-EST. PATIENT-LVL III: ICD-10-PCS | Mod: PBBFAC,,, | Performed by: NURSE PRACTITIONER

## 2023-03-10 PROCEDURE — 99213 OFFICE O/P EST LOW 20 MIN: CPT | Mod: PBBFAC | Performed by: NURSE PRACTITIONER

## 2023-03-10 PROCEDURE — 1159F MED LIST DOCD IN RCRD: CPT | Mod: CPTII,S$PBB,, | Performed by: NURSE PRACTITIONER

## 2023-03-10 PROCEDURE — 85027 COMPLETE CBC AUTOMATED: CPT | Performed by: NURSE PRACTITIONER

## 2023-03-10 PROCEDURE — 80061 LIPID PANEL: CPT | Performed by: NURSE PRACTITIONER

## 2023-03-10 PROCEDURE — 99214 PR OFFICE/OUTPT VISIT, EST, LEVL IV, 30-39 MIN: ICD-10-PCS | Mod: S$PBB,,, | Performed by: NURSE PRACTITIONER

## 2023-03-10 PROCEDURE — 3044F HG A1C LEVEL LT 7.0%: CPT | Mod: CPTII,S$PBB,, | Performed by: NURSE PRACTITIONER

## 2023-03-10 PROCEDURE — 99999 PR PBB SHADOW E&M-EST. PATIENT-LVL III: CPT | Mod: PBBFAC,,, | Performed by: NURSE PRACTITIONER

## 2023-03-10 PROCEDURE — 80053 COMPREHEN METABOLIC PANEL: CPT | Performed by: NURSE PRACTITIONER

## 2023-03-10 PROCEDURE — 3008F PR BODY MASS INDEX (BMI) DOCUMENTED: ICD-10-PCS | Mod: CPTII,S$PBB,, | Performed by: NURSE PRACTITIONER

## 2023-03-10 PROCEDURE — 91200 LIVER ELASTOGRAPHY: CPT | Mod: PBBFAC | Performed by: NURSE PRACTITIONER

## 2023-03-10 PROCEDURE — 36415 COLL VENOUS BLD VENIPUNCTURE: CPT | Performed by: NURSE PRACTITIONER

## 2023-03-10 PROCEDURE — 83036 HEMOGLOBIN GLYCOSYLATED A1C: CPT | Performed by: NURSE PRACTITIONER

## 2023-03-10 PROCEDURE — 99214 OFFICE O/P EST MOD 30 MIN: CPT | Mod: S$PBB,,, | Performed by: NURSE PRACTITIONER

## 2023-03-10 PROCEDURE — 76981 FIBROSCAN NEW ORLEANS (VIBRATION CONTROLLED TRANSIENT ELASTOGRAPHY): ICD-10-PCS | Mod: S$GLB,,, | Performed by: NURSE PRACTITIONER

## 2023-03-10 PROCEDURE — 3044F PR MOST RECENT HEMOGLOBIN A1C LEVEL <7.0%: ICD-10-PCS | Mod: CPTII,S$PBB,, | Performed by: NURSE PRACTITIONER

## 2023-03-10 PROCEDURE — 76981 USE PARENCHYMA: CPT | Mod: S$GLB,,, | Performed by: NURSE PRACTITIONER

## 2023-03-10 PROCEDURE — 1159F PR MEDICATION LIST DOCUMENTED IN MEDICAL RECORD: ICD-10-PCS | Mod: CPTII,S$PBB,, | Performed by: NURSE PRACTITIONER

## 2023-03-10 PROCEDURE — 3008F BODY MASS INDEX DOCD: CPT | Mod: CPTII,S$PBB,, | Performed by: NURSE PRACTITIONER

## 2023-03-10 RX ORDER — NITROFURANTOIN 25; 75 MG/1; MG/1
100 CAPSULE ORAL 2 TIMES DAILY
COMMUNITY
Start: 2023-03-07

## 2023-03-10 RX ORDER — IBUPROFEN 800 MG/1
800 TABLET ORAL EVERY 8 HOURS PRN
COMMUNITY
Start: 2023-02-12

## 2023-03-10 RX ORDER — METHOCARBAMOL 750 MG/1
750 TABLET, FILM COATED ORAL EVERY 8 HOURS PRN
COMMUNITY
Start: 2023-02-12

## 2023-03-10 RX ORDER — GABAPENTIN 300 MG/1
300 CAPSULE ORAL
COMMUNITY
Start: 2023-02-17

## 2023-03-10 RX ORDER — PHENAZOPYRIDINE HYDROCHLORIDE 200 MG/1
200 TABLET, FILM COATED ORAL
COMMUNITY
Start: 2023-03-07

## 2023-03-10 NOTE — PATIENT INSTRUCTIONS
Update liver labs and ultrasound    Repeat Fibroscan today to assess for liver scarring/fibrosis from fatty liver        FATTY LIVER EDUCATION:  There is no FDA approved therapy for non-alcoholic fatty liver disease (NAFLD); therefore, lifestyle changes are important:  1. Weight loss goal of 20 lbs    *Weight loss of 5% has been shown to reduce fat in the liver  *Weight loss of 7-10% has been shown to improve fatty liver, inflammation from fatty liver, and liver fibrosis (scarring/damage)    2. Decreasing daily calories is recommended for weight loss. Try to limit carbohydrate intake to LESS than 30-45 grams of carbs with a meal and LESS than 5-10 grams of carbs with a snack. Avoid drinking beverages with sugar/carbohydrates. Meeting with a dietician or using one of the weight loss apps below can be helpful to determine individualized calorie goals and add up carbs throughout the day. Look for snacks high in protein, low in carbohydrates/sugar.    3. Exercise as tolerated. Studies have shown that exercise improves fatty liver even without weight loss.     4. Recommend good control of cholesterol, blood pressure, blood sugar levels (as these are risk factors for fatty liver). Cholesterol lowering medications including statins are typically safe and beneficial (even if liver enzymes are elevated)    5. Limit alcohol consumption, no more than 1 serving(s) of alcohol in any day (1 serving is 5 ounces of wine, 12 ounces of beer, or 1.5 ounces of liquor). Do not recommend daily alcohol use or drinking alcohol most days per week.    In some people, fatty liver can progress to steatohepatitis (inflamatory fatty liver). This can cause liver scarring or damage (fibrosis) and possibly to cirrhosis. Cirrhosis increases risk of liver cancer and liver failure. Lifestyle changes now can help to decrease this risk.     Ask about our fatty liver/ROBERTS clinical trials if you have fibrosis / scar tissue related to fatty  liver.        Additional Resources:    Websites with information about fatty liver and inflammation related to fatty liver (ROBERTS): www.nashtruth.com and www.nashactually.com   [facility]: Non-Alcoholic Fatty Liver Disease (NAFLD)    Facebook support group with tips and recipes:   Non-Alcoholic Fatty Liver Disease (NAFLD) Diet & Nutrition Support     Can download [employer] Pal or Lose It demond to add up your carbohydrates throughout the day.      Try www.[employer] for recipes.    If interested in seeing a dietician to create a weight loss plan, contact the dietician team at [facility]: [email or web address].  You can also call to schedule a consult with a dietician: [phone]  *Virtual visits are available with one of our dieticians.     If you have diabetes or high blood pressure, you can meet with a Health  through [facility] program. Let us know if you are interested in a referral.     Let us know if you are interested in a referral to [facility].

## 2023-03-10 NOTE — PROGRESS NOTES
[facility] - Established Patient    Last Clinic Visit: 1/6/21    Chief Complaint: Follow-up for fatty liver       HISTORY     This is a 42 y.o. female with PMH noted below, here for follow-up of fatty liver.    Fatty liver first suggested on imaging in 2017. Her Fibroscan in 2018 showed significant steatosis.    She has had elevated transaminases dating back to 2014. ALT>AST, ALT max 130s.    Initial serologic workup revealed (+) AMA at 1:640. IgG elevated [id number]. STEPH and ASMA negative.    She underwent liver biopsy 6/2018:  No findings of PBC  No ROBERTS though 10% macrosteatosis  No fibrosis    Fibrosis staging:   Fibroscan 2/2/16 = F0-F1  Fibroscan 6/1/18 = F0-F1, S3  Liver biopsy 6/2018 = F0  Fibroscan 1/2021 = F0-F1, S3    Health Maintenance:  - Most recent imaging: abd US 8/31/18 with mild hepatomegaly, no liver lesions or biliary dilation; no findings to suggest advanced liver disease  - Hepatitis A & B vaccination: +immunity    Interval history:  Reports GI symptoms for the last few weeks- nausea, epigastric and LUQ pain, bowel urgency. Has not seen GI in a while.    Updates on risk factors for fatty liver:  Weight -- Body mass index is 36.18 kg/m².     Weight  is up ~10 lb                   Dyslipidemia -- TG elevated in 2021, no recent lipid panel                              Insulin resistance / diabetes -- last HgbA1c 5.5        Hypertension -- BP has been well controlled  Alcohol use -- none    Liver enzymes have improved over the years, labs last year with ALT down to 50s.    Denies symptoms of hepatic decompensation including jaundice, ascites, cognitive problems to suggest hepatic encephalopathy, or GI bleeding.         Past medical history, surgical history, problem list, family history, social history, allergies: Reviewed and updated in the appropriate section of the electronic medical record.    Current Outpatient Medications   Medication Sig Dispense Refill    cetirizine (ZYRTEC)  10 MG tablet Take 1 tablet (10 mg total) by mouth once daily. 30 tablet 0    fluticasone propionate (FLONASE) 50 mcg/actuation nasal spray 1 spray (50 mcg total) by Each Nostril route 2 (two) times daily as needed. 15 g 0    gabapentin (NEURONTIN) 300 MG capsule Take 300 mg by mouth.      hydrOXYzine HCL (ATARAX) 25 MG tablet Take 1 tablet (25 mg total) by mouth 2 (two) times daily as needed for Itching. 60 tablet 2    ibuprofen (ADVIL,MOTRIN) 800 MG tablet Take 800 mg by mouth every 8 (eight) hours as needed.      methocarbamoL (ROBAXIN) 750 MG Tab Take 750 mg by mouth every 8 (eight) hours as needed.      naproxen (NAPROSYN) 500 MG tablet Take 1 tablet (500 mg total) by mouth 2 (two) times daily with meals. 20 tablet 0    nitrofurantoin, macrocrystal-monohydrate, (MACROBID) 100 MG capsule Take 100 mg by mouth 2 (two) times daily.      NYSTOP powder 3 (three) times daily as needed.      phenazopyridine (PYRIDIUM) 200 MG tablet Take 200 mg by mouth.      valACYclovir (VALTREX) 500 MG tablet Take 500 mg by mouth 2 (two) times daily.      vitamin D (VITAMIN D3) 1000 units Tab Take 1,000 Units by mouth once daily.      phentermine (ADIPEX-P) 37.5 mg tablet Take 37.5 mg by mouth every morning.      triamcinolone acetonide 0.1% (KENALOG) 0.1 % cream Apply 15 g (15,000 mg total) topically 2 (two) times daily. 80 g 0     No current facility-administered medications for this visit.     Medication list reviewed and updated.      Review of Systems - as per HPI  Constitutional: Negative for fatigue or unexpected weight change.   Respiratory: Negative for shortness of breath.    Cardiovascular: Negative for leg swelling.  Gastrointestinal: Negative for abdominal distention. Negative for melena or hematemesis. +nausea, abdominal pain  Musculoskeletal: Negative for myalgias.    Skin: Negative for itching.  Neurological: Negative for confusion or slowed mentation. Negative for tremors.   Hematological: Does not bruise/bleed  easily.   Psychiatric: Negative for sleep disturbance.      Physical Exam   Constitutional: Well-nourished. No distress. Alert and oriented.  Eyes: No scleral icterus.   Pulmonary/Chest: Respiratory effort normal. No respiratory distress.   Abdominal: No distension, no ascites appreciated.   Extremities: No edema.   Neurological: No tremor or asterixis. Gait normal.  Skin: No jaundice. No spider telangiectasias or palmar erythema.  Psychiatric: Normal mood and affect. Speech, behavior, and thought content normal. No depression or anxiety noted.         LABS & DIAGNOSTIC STUDIES     I have personally reviewed pertinent laboratory findings:    Lab Results   Component Value Date    ALT 51 (H) 03/10/2023    AST 26 03/10/2023    ALKPHOS 82 03/10/2023    BILITOT 0.4 03/10/2023    ALBUMIN 3.8 03/10/2023    INR 1.0 06/01/2018       Lab Results   Component Value Date    WBC 3.30 (L) 03/10/2023    HGB 13.6 03/10/2023    HCT 43.2 03/10/2023    MCV 93 03/10/2023     03/10/2023       Lab Results   Component Value Date     03/10/2023    K 3.9 03/10/2023    BUN 9 03/10/2023    CREATININE 0.8 03/10/2023    ESTGFRAFRICA >60.0 02/04/2022    EGFRNONAA >60.0 02/04/2022       Lab Results   Component Value Date    SMOOTHMUSCAB Negative 1:40 06/01/2018    AMAIFA Positive 1:640 (A) 06/01/2018    IGGSERUM 1676 (H) 06/01/2018    ANASCREEN Negative <1:160 06/01/2018    FERRITIN 100 02/02/2016    FESATURATED 15 (L) 02/02/2016    PETH Negative 06/06/2019    [id number] MM 02/02/2016    DGODA1BDWHYH 108 02/02/2016    CERULOPLSM 26.0 02/02/2016    HEPBSAG Negative 02/02/2016    HEPBCAB Negative 02/02/2016    HEPCAB Negative 02/02/2016    HCVRNAQUANTP <12 08/01/2016    MZA31EBIF Negative 06/02/2020       Lab Results   Component Value Date    AFP 7.5 06/09/2017       I have personally reviewed the following result reports:  Abdominal US - 8/31/18  Liver biopsy - 6/15/18      ASSESSMENT & PLAN     42 y.o. female with:    1. NAFLD  --  Fibroscans/liver biopsy have been reassuring, no evidence of fibrosis. Will repeat Fibroscan today and continue every 2 years if no change  -- Recommend labs to monitor liver enzymes/LFTs 1-2 times per year, check today  -- US every 2 years, update now    Reminded that the only treatment for fatty liver is weight loss, maintaining good control of metabolic risk factors (blood pressure, cholesterol, and blood sugar), and moderating/decreasing alcohol use.      2. Body mass index is 36.18 kg/m²., pre-diabetes  -- We reviewed her risk factors for fatty liver  -- Reviewed weight loss strategies including dietary changes and physical activity. Discussed low carbohydrate, low sugar diet. Recommend long-term weight loss goal of 10% (about 20 lb).   -- Update lipid panel and HgbA1c today    3. Positive AMA  -- Biopsy without findings of PBC and alk phos remains normal  -- If alk phos becomes elevated, would give trial of ursodiol     4. Nausea, abdominal pain  -- Discussed that symptoms should not be caused by her liver. Recommend f/u with GI for further evaluation      Orders Placed This Encounter   Procedures    FibroScan [place] (Vibration Controlled Transient Elastography)    US Abdomen Limited    CBC Without Differential    Comprehensive Metabolic Panel    Hemoglobin A1C    Lipid Panel    Hepatic Function Panel       *See AVS for patient education and instructions.      Return to clinic in 1 year with labs      Thank you for allowing me to participate in the care of Zeferino Pearson, FNP-C  Hepatology        Duration of encounter: 30 min  This includes face to face time and non-face to face time preparing to see the patient (eg, review of tests), obtaining and/or reviewing separately obtained history, documenting clinical information in the electronic or other health record, independently interpreting results and communicating results to the patient/family/caregiver, or care  coordination.

## 2023-03-10 NOTE — PROCEDURES
FibroScan [place] (Vibration Controlled Transient Elastography)    Date/Time: 3/10/2023 11:45 AM  Performed by: Nikki Pearson NP  Authorized by: Nikki Pearson NP     Diagnosis:  NAFLD    Probe:  M    Universal Protocol: Patient's identity, procedure and site were verified, confirmatory pause was performed.  Discussed procedure including risks and potential complications.  Questions answered.  Patient verbalizes understanding and wishes to proceed with VCTE.     Procedure: After providing explanations of the procedure, patient was placed in the supine position with right arm in maximum abduction to allow optimal exposure of right lateral abdomen.  Patient was briefly assessed, Testing was performed in the mid-axillary location, 50Hz Shear Wave pulses were applied and the resulting Shear Wave and Propagation Speed detected with a 3.5 MHz ultrasonic signal, using the FibroScan probe, Skin to liver capsule distance and liver parenchyma were accessed during the entire examination with the FibroScan probe, Patient was instructed to breathe normally and to abstain from sudden movements during the procedure, allowing for random measurements of liver stiffness. At least 10 Shear Waves were produced, Individual measurements of each Shear Wave were calculated.  Patient tolerated the procedure well with no complications.  Meets discharge criteria as was dismissed.  Rates pain 0 out of 10.  Patient will follow up with ordering provider to review results.    Findings  Median liver stiffness score:  5.4  CAP Reading: dB/m:  329    IQR/med %:  9  Interpretation  Fibrosis interpretation is based on medial liver stiffness - Kilopascal (kPa).    Fibrosis Stage:  F 0-1  Steatosis interpretation is based on controlled attenuation parameter - (dB/m).    Steatosis Grade:  S3

## 2023-03-14 ENCOUNTER — TELEPHONE (OUTPATIENT)
Dept: HEPATOLOGY | Facility: CLINIC | Age: 43
End: 2023-03-14
Payer: COMMERCIAL

## 2023-03-14 NOTE — TELEPHONE ENCOUNTER
----- Message from Nikki Perason NP sent at 3/13/2023  4:28 PM CDT -----  Please enter recall for f/u visit in 1 year with labs, thanks!

## 2023-03-28 ENCOUNTER — PATIENT MESSAGE (OUTPATIENT)
Dept: RESEARCH | Facility: HOSPITAL | Age: 43
End: 2023-03-28
Payer: COMMERCIAL

## 2023-04-06 ENCOUNTER — OFFICE VISIT (OUTPATIENT)
Dept: URGENT CARE | Facility: CLINIC | Age: 43
End: 2023-04-06
Payer: COMMERCIAL

## 2023-04-06 VITALS
SYSTOLIC BLOOD PRESSURE: 134 MMHG | OXYGEN SATURATION: 97 % | BODY MASS INDEX: 35.85 KG/M2 | RESPIRATION RATE: 16 BRPM | WEIGHT: 210 LBS | TEMPERATURE: 98 F | DIASTOLIC BLOOD PRESSURE: 91 MMHG | HEART RATE: 91 BPM | HEIGHT: 64 IN

## 2023-04-06 DIAGNOSIS — K52.9 GASTROENTERITIS: Primary | ICD-10-CM

## 2023-04-06 DIAGNOSIS — R19.7 DIARRHEA, UNSPECIFIED TYPE: ICD-10-CM

## 2023-04-06 DIAGNOSIS — R11.0 NAUSEA: ICD-10-CM

## 2023-04-06 PROCEDURE — 87427 SHIGA-LIKE TOXIN AG IA: CPT

## 2023-04-06 PROCEDURE — 87046 STOOL CULTR AEROBIC BACT EA: CPT

## 2023-04-06 PROCEDURE — 99214 OFFICE O/P EST MOD 30 MIN: CPT | Mod: S$GLB,,,

## 2023-04-06 PROCEDURE — 87045 FECES CULTURE AEROBIC BACT: CPT

## 2023-04-06 PROCEDURE — 87493 C DIFF AMPLIFIED PROBE: CPT

## 2023-04-06 PROCEDURE — 87449 NOS EACH ORGANISM AG IA: CPT

## 2023-04-06 PROCEDURE — 99214 PR OFFICE/OUTPT VISIT, EST, LEVL IV, 30-39 MIN: ICD-10-PCS | Mod: S$GLB,,,

## 2023-04-06 PROCEDURE — 87209 SMEAR COMPLEX STAIN: CPT

## 2023-04-06 PROCEDURE — 87177 OVA AND PARASITES SMEARS: CPT

## 2023-04-06 RX ORDER — ONDANSETRON 4 MG/1
4 TABLET, ORALLY DISINTEGRATING ORAL EVERY 12 HOURS PRN
Qty: 8 TABLET | Refills: 0 | Status: SHIPPED | OUTPATIENT
Start: 2023-04-06 | End: 2023-04-10

## 2023-04-06 NOTE — PATIENT INSTRUCTIONS
Collect stool and drop off at the nearest [facility] facility such as [place] or [facility].  Once the lab results are back we will notify you of the results.      Drink plenty of fluids.  If you show any signs of dehydration such as  Flushed face  Extreme thirst, more than normal or unable to drink  Dry, warm skin  Cannot pass urine or reduced amounts, dark, yellow  Dizziness made worse when you are standing  Weakness  Cramping in the arms and legs  Crying with few or no tears  Sleepy or irritable  Unwell  Headaches  Dry mouth, dry tongue; with thick saliva.  Report to your PCP or the ER.

## 2023-04-06 NOTE — PROGRESS NOTES
"Subjective:      Patient ID: Zeferino Moon is a 42 y.o. female.    Vitals:  height is 5' 4" (1.626 m) and weight is 95.3 kg (210 lb). Her oral temperature is 98.2 °F (36.8 °C). Her blood pressure is 134/91 (abnormal) and her pulse is 91. Her respiration is 16 and oxygen saturation is 97%.     Chief Complaint: Diarrhea    Diarrhea   This is a new problem. Episode onset: 2 days ago. The problem occurs 5 to 10 times per day. The problem has been waxing and waning. The stool consistency is described as Bloody and watery. The patient states that diarrhea awakens her from sleep. Associated symptoms include abdominal pain, bloating, headaches (off and on) and sweats. Pertinent negatives include no chills, fever or vomiting. Associated symptoms comments: Nausea 2 hours ago, acid reflux last night  . Treatments tried: peggy seltzer cold, pepto, tums. The treatment provided no relief. There is no history of irritable bowel syndrome or a recent abdominal surgery.     Constitution: Negative for chills and fever.   Gastrointestinal:  Positive for abdominal pain, nausea, diarrhea and bowel incontinence. Negative for abdominal bloating, history of abdominal surgery, vomiting, bright red blood in stool, dark colored stools, rectal bleeding, rectal pain and hemorrhoids.   Genitourinary:  Negative for dysuria, vaginal discharge, vaginal bleeding, vaginal odor, painful ejaculation, genital sore, painful ejaculation and pelvic pain.   Neurological:  Positive for headaches (off and on). Negative for history of migraines.    Objective:     Physical Exam   Constitutional: She is oriented to person, place, and time. She appears well-developed.   HENT:   Head: Normocephalic and atraumatic.   Ears:   Right Ear: External ear normal.   Left Ear: External ear normal.   Nose: Nose normal.   Mouth/Throat: Mucous membranes are normal.   Eyes: Conjunctivae and lids are normal.   Neck: Trachea normal. Neck supple.   Cardiovascular: Normal " rate, regular rhythm and normal heart sounds.   Pulmonary/Chest: Effort normal and breath sounds normal. No respiratory distress.   Abdominal: Normal appearance and bowel sounds are normal. She exhibits no distension, no pulsatile midline mass and no mass. Soft. There is no splenomegaly or hepatomegaly. There is no abdominal tenderness. There is no rebound, no guarding, no tenderness at McBurney's point, negative Kim's sign, no left CVA tenderness, negative Rovsing's sign, negative psoas sign, no right CVA tenderness and negative obturator sign. No hernia.   Musculoskeletal: Normal range of motion.         General: Normal range of motion.   Neurological: She is alert and oriented to person, place, and time. She has normal strength.   Skin: Skin is warm, dry, intact, not diaphoretic and not pale.   Psychiatric: Her speech is normal and behavior is normal. Judgment and thought content normal.   Nursing note and vitals reviewed.    Assessment:     1. Diarrhea of presumed infectious origin        Plan:   Pt presents to clinic with complaints of liquid diarrhea that is occurring several times a day.  Denies any blood in stool or mucus.      Diarrhea of presumed infectious origin  -     C Diff Toxin by PCR  -     Stool Exam-Ova,Cysts,Parasites  -     CULTURE, STOOL    Collect stool and drop off at the nearest [facility] facility such as [place] or [facility].  Once the lab results are back we will notify you of the results.      Drink plenty of fluids.  If you show any signs of dehydration such as  Flushed face  Extreme thirst, more than normal or unable to drink  Dry, warm skin  Cannot pass urine or reduced amounts, dark, yellow  Dizziness made worse when you are standing  Weakness  Cramping in the arms and legs  Crying with few or no tears  Sleepy or irritable  Unwell  Headaches  Dry mouth, dry tongue; with thick saliva.  Report to your PCP or the ER.      Discussed plan of care with patient.  They voiced full  understanding and are in agreement with the current plan of care.  All known questions and concerns addressed at this time.      You must understand that you have received treatment at an Urgent Care Facility only, and that you may be released before all of your medical problems are known or treated.  Urgent Care facilities are not equipped to handle life threatening emergencies.  It is recommended that you seek care at an Emergency Department for further evaluation of worsening or concerning symptoms, or possibly life threatening conditions as discussed.

## 2023-04-06 NOTE — LETTER
April 6, 2023      [facility] [facility]  [address], [address]  [place] [address]  Phone: [phone]  Fax: [phone]       Patient: Zeferino Moon   YOB: 1980  Date of Visit: 04/06/2023    To Whom It May Concern:    Josué Moon  was at [facility] on 04/06/2023. The patient may return to work/school on 4/8/23 with no restrictions or once she is fever free for greater than 24 hours. If you have any questions or concerns, or if I can be of further assistance, please do not hesitate to contact me.    Sincerely,        Nat Campbell, NP

## 2023-04-07 ENCOUNTER — TELEPHONE (OUTPATIENT)
Dept: URGENT CARE | Facility: CLINIC | Age: 43
End: 2023-04-07
Payer: COMMERCIAL

## 2023-04-07 LAB — C DIFF TOX GENS STL QL NAA+PROBE: NEGATIVE

## 2023-04-07 NOTE — TELEPHONE ENCOUNTER
Called and let patient know of her negative test results.  She states that she is feeling a little better, but that she is still having nausea and diarrhea.  She states the Zofran has helped greatly.  Encouraged her to keep drinking plenty of fluids such as pedialyte or gatarade and get plenty of rest.  She voiced full understanding and agreed to do so.  Answered all questions and concerns voiced at this time.

## 2023-04-09 LAB
E COLI SXT1 STL QL IA: NEGATIVE
E COLI SXT2 STL QL IA: NEGATIVE

## 2023-04-10 LAB — BACTERIA STL CULT: NORMAL

## 2023-04-19 LAB — O+P STL MICRO: NORMAL

## 2024-04-26 ENCOUNTER — OFFICE VISIT (OUTPATIENT)
Dept: HEPATOLOGY | Facility: CLINIC | Age: 44
End: 2024-04-26
Payer: OTHER GOVERNMENT

## 2024-04-26 VITALS — WEIGHT: 209.44 LBS | BODY MASS INDEX: 35.76 KG/M2 | HEIGHT: 64 IN

## 2024-04-26 DIAGNOSIS — K76.0 METABOLIC DYSFUNCTION-ASSOCIATED STEATOTIC LIVER DISEASE (MASLD): Primary | ICD-10-CM

## 2024-04-26 DIAGNOSIS — E66.01 CLASS 2 SEVERE OBESITY WITH SERIOUS COMORBIDITY AND BODY MASS INDEX (BMI) OF 35.0 TO 35.9 IN ADULT, UNSPECIFIED OBESITY TYPE: ICD-10-CM

## 2024-04-26 DIAGNOSIS — R74.8 ELEVATED LIVER ENZYMES: ICD-10-CM

## 2024-04-26 DIAGNOSIS — R73.03 PRE-DIABETES: ICD-10-CM

## 2024-04-26 DIAGNOSIS — R76.8 ANTIMITOCHONDRIAL ANTIBODY POSITIVE: ICD-10-CM

## 2024-04-26 PROCEDURE — 99999 PR PBB SHADOW E&M-EST. PATIENT-LVL III: CPT | Mod: PBBFAC,,, | Performed by: NURSE PRACTITIONER

## 2024-04-26 PROCEDURE — 99214 OFFICE O/P EST MOD 30 MIN: CPT | Mod: S$PBB,,, | Performed by: NURSE PRACTITIONER

## 2024-04-26 PROCEDURE — 99213 OFFICE O/P EST LOW 20 MIN: CPT | Mod: PBBFAC | Performed by: NURSE PRACTITIONER

## 2024-04-26 NOTE — PATIENT INSTRUCTIONS
Med options for weight loss/blood sugar - ozempic, mounjaro, wegovy, zepbound   Labs, ultrasound, and Fibroscan in 1 year

## 2024-04-26 NOTE — PROGRESS NOTES
[facility] - Established Patient    Last Clinic Visit: 3/10/23    Chief Complaint: Follow-up for fatty liver       HISTORY     This is a 43 y.o. female with PMH noted below, here for follow-up of fatty liver.    Fatty liver first suggested on imaging in 2017. Her Fibroscan in 2018 showed significant steatosis.    She has had elevated transaminases dating back to 2014. ALT>AST, ALT max 130s.    Initial serologic workup revealed (+) AMA at 1:640. IgG elevated [id number]. STEPH and ASMA negative.    Of note her alk phos has always been WNL.     She underwent liver biopsy 6/2018:  No findings of PBC  No ROBERTS though 10% macrosteatosis  No fibrosis    Fibrosis staging:   Fibroscan 2/2/16 = F0-F1  Fibroscan 6/1/18 = F0-F1, S3  Liver biopsy 6/2018 = F0  Fibroscan 1/2021 = F0-F1, S3  Fibroscan 3/2023 = F0-F1 S3    Interval history:  Feels well overall.  Denies symptoms of hepatic decompensation including jaundice, ascites, cognitive problems to suggest hepatic encephalopathy, or GI bleeding.     Had outside testing with +STEPH, will have consult with Rheumatology.     Updates on risk factors for fatty liver:  Weight -- Body mass index is 35.95 kg/m².     Has been struggling with weight loss  [facility]   She is intermittent fasting and keeping a food log                Tried adipex though did not have success with this  Interested in GLPs though was told she would not be eligible     Dyslipidemia -- well controlled last year           Insulin resistance / diabetes -- HgbA1c 5.8      Alcohol use -- none    Liver enzymes: improving as of last year, ALT 50s. Alk phos WNL.    Health Maintenance:  - Most recent imaging: abd US 3/2023 without focal hepatic lesion  - Hepatitis A & B vaccination: +immunity          Past medical history, surgical history, problem list, family history, social history, allergies: Reviewed and updated in the appropriate section of the electronic medical record.    Current  Outpatient Medications   Medication Sig Dispense Refill    fluticasone propionate (FLONASE) 50 mcg/actuation nasal spray 1 spray (50 mcg total) by Each Nostril route 2 (two) times daily as needed. 15 g 0    gabapentin (NEURONTIN) 300 MG capsule Take 300 mg by mouth.      hydrOXYzine HCL (ATARAX) 25 MG tablet Take 1 tablet (25 mg total) by mouth 2 (two) times daily as needed for Itching. 60 tablet 2    ibuprofen (ADVIL,MOTRIN) 800 MG tablet Take 800 mg by mouth every 8 (eight) hours as needed.      methocarbamoL (ROBAXIN) 750 MG Tab Take 750 mg by mouth every 8 (eight) hours as needed.      nitrofurantoin, macrocrystal-monohydrate, (MACROBID) 100 MG capsule Take 100 mg by mouth 2 (two) times daily.      NYSTOP powder 3 (three) times daily as needed.      phenazopyridine (PYRIDIUM) 200 MG tablet Take 200 mg by mouth.      phentermine (ADIPEX-P) 37.5 mg tablet Take 37.5 mg by mouth every morning.      valACYclovir (VALTREX) 500 MG tablet Take 500 mg by mouth 2 (two) times daily.      vitamin D (VITAMIN D3) 1000 units Tab Take 1,000 Units by mouth once daily.      cetirizine (ZYRTEC) 10 MG tablet Take 1 tablet (10 mg total) by mouth once daily. 30 tablet 0     No current facility-administered medications for this visit.     Medication list reviewed and updated.      Review of Systems - as per HPI  Constitutional: Negative for unexpected weight change.   Respiratory: Negative for shortness of breath.    Cardiovascular: Negative for leg swelling.  Gastrointestinal: Negative for abdominal pain. Negative for melena or hematemesis. +bloating  Musculoskeletal: Negative for myalgias.    Skin: Negative for jaundice or itching.  Neurological: Negative for confusion or slowed mentation. Negative for tremors.   Hematological: Does not bruise/bleed easily.       Physical Exam   Constitutional: No distress. Alert and oriented.  Eyes: No scleral icterus.   Pulmonary/Chest: Respiratory effort normal. No respiratory distress.    Abdominal: No distension, no ascites appreciated.   Extremities: No edema.   Neurological: No tremor.   Skin: No jaundice.   Psychiatric: Normal mood and affect. Speech, behavior, and thought content normal.       LABS & DIAGNOSTIC STUDIES     I have personally reviewed pertinent laboratory findings:    Lab Results   Component Value Date    ALT 51 (H) 03/10/2023    AST 26 03/10/2023    ALKPHOS 82 03/10/2023    BILITOT 0.4 03/10/2023    ALBUMIN 3.8 03/10/2023    INR 1.0 06/01/2018       Lab Results   Component Value Date    WBC 3.30 (L) 03/10/2023    HGB 13.6 03/10/2023    HCT 43.2 03/10/2023    MCV 93 03/10/2023     03/10/2023       Lab Results   Component Value Date     03/10/2023    K 3.9 03/10/2023    BUN 9 03/10/2023    CREATININE 0.8 03/10/2023    ESTGFRAFRICA >60.0 02/04/2022    EGFRNONAA >60.0 02/04/2022       Lab Results   Component Value Date    SMOOTHMUSCAB Negative 1:40 06/01/2018    AMAIFA Positive 1:640 (A) 06/01/2018    IGGSERUM 1676 (H) 06/01/2018    ANASCREEN Negative <1:160 06/01/2018    FERRITIN 100 02/02/2016    FESATURATED 15 (L) 02/02/2016    PETH Negative 06/06/2019    [id number] MM 02/02/2016    [id number] 108 02/02/2016    CERULOPLSM 26.0 02/02/2016    HEPBSAG Negative 02/02/2016    HEPBCAB Negative 02/02/2016    HEPCAB Negative 02/02/2016    HCVRNAQUANTP <12 08/01/2016    [id number] Negative 06/02/2020       Lab Results   Component Value Date    AFP 7.5 06/09/2017       I have personally reviewed the following result reports:  Abdominal US - 3/2023  Liver biopsy - 6/15/18      ASSESSMENT & PLAN     43 y.o. female with:    1. Metabolic dysfunction-associated steatotic liver disease (MASLD)    2. Elevated liver enzymes    3. Antimitochondrial antibody positive    4. Pre-diabetes    5. Class 2 severe obesity with serious comorbidity and body mass index (BMI) of 35.0 to 35.9 in adult, unspecified obesity type        Fibroscans/liver biopsy reassuring, no fibrosis (F0). Liver  enzymes have improved some over the years.     She has a positive AMA though alk phos has always been normal and biopsy without findings of PBC.     Recommendations:   Repeat Fibroscan in 1 year  Recommend labs to monitor liver enzymes/LFTs every 6-12 months, which can include labs with PCP  US surveillance every 2 years  Encouraged ongoing weight loss efforts. Low carbohydrate/sugar, high protein/fiber diet. Discussed strategies to reduce calories.   150 min/week of moderate exercise (aerobic + resistance), as tolerated  Maintain good control of metabolic risk factors, specifically blood sugar. She may be a candidate for GLP; recommend checking into insurance coverage of Wegovy or  Zepbound.   No evidence of PBC at this time though if alk phos were to become elevated, would give trial of ursodiol         Orders Placed This Encounter   Procedures    FibroScan [facility](Vibration Controlled Transient Elastography)    US Abdomen Limited    Hepatic Function Panel       Return to clinic in 1 year with labs, US, Fibroscan.      Thank you for allowing me to participate in the care of Zeferino Pearson, ANNETTEP-C  Hepatology        Duration of encounter: 30 min  This includes face to face time and non-face to face time preparing to see the patient (eg, review of tests), obtaining and/or reviewing separately obtained history, documenting clinical information in the electronic or other health record, independently interpreting results and communicating results to the patient/family/caregiver, or care coordination.

## 2024-10-29 DIAGNOSIS — Z12.31 ENCOUNTER FOR SCREENING MAMMOGRAM FOR MALIGNANT NEOPLASM OF BREAST: Primary | ICD-10-CM

## 2025-04-11 ENCOUNTER — RESULTS FOLLOW-UP (OUTPATIENT)
Facility: CLINIC | Age: 45
End: 2025-04-11

## 2025-04-22 ENCOUNTER — PROCEDURE VISIT (OUTPATIENT)
Dept: HEPATOLOGY | Facility: CLINIC | Age: 45
End: 2025-04-22
Payer: OTHER GOVERNMENT

## 2025-04-22 ENCOUNTER — OFFICE VISIT (OUTPATIENT)
Dept: HEPATOLOGY | Facility: CLINIC | Age: 45
End: 2025-04-22
Payer: OTHER GOVERNMENT

## 2025-04-22 VITALS — WEIGHT: 199.31 LBS | BODY MASS INDEX: 34.03 KG/M2 | HEIGHT: 64 IN

## 2025-04-22 DIAGNOSIS — K76.0 METABOLIC DYSFUNCTION-ASSOCIATED STEATOTIC LIVER DISEASE (MASLD): Primary | ICD-10-CM

## 2025-04-22 DIAGNOSIS — R73.03 PRE-DIABETES: ICD-10-CM

## 2025-04-22 DIAGNOSIS — K76.0 METABOLIC DYSFUNCTION-ASSOCIATED STEATOTIC LIVER DISEASE (MASLD): ICD-10-CM

## 2025-04-22 DIAGNOSIS — R76.8 ANTIMITOCHONDRIAL ANTIBODY POSITIVE: ICD-10-CM

## 2025-04-22 DIAGNOSIS — E66.811 CLASS 1 OBESITY WITH BODY MASS INDEX (BMI) OF 34.0 TO 34.9 IN ADULT, UNSPECIFIED OBESITY TYPE, UNSPECIFIED WHETHER SERIOUS COMORBIDITY PRESENT: ICD-10-CM

## 2025-04-22 DIAGNOSIS — R74.8 ELEVATED LIVER ENZYMES: ICD-10-CM

## 2025-04-22 PROCEDURE — 91200 LIVER ELASTOGRAPHY: CPT | Mod: 26,S$PBB,, | Performed by: NURSE PRACTITIONER

## 2025-04-22 PROCEDURE — 99999 PR PBB SHADOW E&M-EST. PATIENT-LVL III: CPT | Mod: PBBFAC,,, | Performed by: NURSE PRACTITIONER

## 2025-04-22 PROCEDURE — 99213 OFFICE O/P EST LOW 20 MIN: CPT | Mod: PBBFAC | Performed by: NURSE PRACTITIONER

## 2025-04-22 PROCEDURE — 91200 LIVER ELASTOGRAPHY: CPT | Mod: PBBFAC | Performed by: NURSE PRACTITIONER

## 2025-04-22 PROCEDURE — 99214 OFFICE O/P EST MOD 30 MIN: CPT | Mod: S$PBB,,, | Performed by: NURSE PRACTITIONER

## 2025-04-22 RX ORDER — SUMATRIPTAN SUCCINATE 25 MG/1
25 TABLET ORAL
COMMUNITY
Start: 2024-09-18

## 2025-04-22 RX ORDER — LEVONORGESTREL 13.5 MG/1
INTRAUTERINE DEVICE INTRAUTERINE
COMMUNITY

## 2025-04-22 RX ORDER — ONDANSETRON 4 MG/1
TABLET, FILM COATED ORAL
COMMUNITY
Start: 2024-06-12

## 2025-04-22 RX ORDER — SEMAGLUTIDE 1 MG/.5ML
INJECTION, SOLUTION SUBCUTANEOUS
COMMUNITY
Start: 2025-01-08

## 2025-04-22 NOTE — PROGRESS NOTES
[facility] - Established Patient    Chief Complaint: Follow-up for fatty liver     HISTORY     This is a 44 y.o. female with PMH noted below, here for follow-up of fatty liver.    Fatty liver first suggested on imaging in 2017. Her Fibroscan in 2018 showed significant steatosis.    She has had elevated transaminases dating back to 2014. ALT>AST, ALT max 130s.    Initial serologic workup revealed (+) AMA at 1:640. IgG elevated [id number]. STEPH and ASMA negative.    Of note her alk phos has always been WNL.     She underwent liver biopsy 6/2018:  No findings of PBC  No ROBERTS though 10% macrosteatosis  No fibrosis    Fibrosis staging:   Fibroscan 2/2/16 = F0-F1  Fibroscan 6/1/18 = F0-F1, S3  Liver biopsy 6/2018 = F0  Fibroscan 1/2021 = F0-F1, S3  Fibroscan 3/2023 = F0-F1 S3    Interval history:  Feels well overall. She was last seen in clinic by Nikki Pearson NP in 4/2024.     Denies symptoms of hepatic decompensation including jaundice, ascites, cognitive problems to suggest hepatic encephalopathy, or GI bleeding.     Had outside testing with +STEPH. She was seen by an outside Rheumatologist, and diagnosed with Sjogren's syndrome.    Updates on risk factors for fatty liver:  Weight -- Body mass index is 34.21 kg/m².  - On Wegovy with net weight loss of 10 lbs since last visit.  Dyslipidemia -- last lipid panel on file was normal in 3/2023.          Insulin resistance / diabetes -- per patient last HgbA1c on file was 5.8% (3/2025)   Alcohol use -- none    Liver enzymes: ALT remains mildly elevated. Alk phos WNL.    Fibroscan today is improved due to weight loss, and is suggestive of moderate fatty infiltration of the liver (S2), with F0-F1 fibrosis, and a low likelihood of cirrhosis.    Health Maintenance:  - Most recent imaging: abd US 4/2025 showed persistent hepatomegaly and hepatic steatosis, without focal hepatic lesion  - Hepatitis A & B vaccination: +immunity    Past medical history, surgical  history, problem list, family history, social history, allergies: Reviewed and updated in the appropriate section of the electronic medical record.    Current Outpatient Medications   Medication Sig Dispense Refill    fluticasone propionate (FLONASE) 50 mcg/actuation nasal spray 1 spray (50 mcg total) by Each Nostril route 2 (two) times daily as needed. 15 g 0    hydrOXYzine HCL (ATARAX) 25 MG tablet Take 1 tablet (25 mg total) by mouth 2 (two) times daily as needed for Itching. 60 tablet 2    ibuprofen (ADVIL,MOTRIN) 800 MG tablet Take 800 mg by mouth every 8 (eight) hours as needed.      methocarbamoL (ROBAXIN) 750 MG Tab Take 750 mg by mouth every 8 (eight) hours as needed.      NYSTOP powder 3 (three) times daily as needed.      ondansetron (ZOFRAN) 4 MG tablet 1 tablet Orally every 8 hours PRN nausea      semaglutide, weight loss, (WEGOVY) 1 mg/0.5 mL PnIj 1mg Subcutaneous once a week for 28 days      sumatriptan (IMITREX) 25 MG Tab Take 25 mg by mouth.      valACYclovir (VALTREX) 500 MG tablet Take 500 mg by mouth 2 (two) times daily.      vitamin D (VITAMIN D3) 1000 units Tab Take 1,000 Units by mouth once daily.      cetirizine (ZYRTEC) 10 MG tablet Take 1 tablet (10 mg total) by mouth once daily. 30 tablet 0    levonorgestreL (SONAL) 14 mcg/24 hr (3 yrs) 13.5 mg IUD as directed Intrauterine       No current facility-administered medications for this visit.     Medication list reviewed and updated.      Review of Systems - as per HPI  Constitutional: Negative for unexpected weight change.   Respiratory: Negative for shortness of breath.    Cardiovascular: Negative for leg swelling.  Gastrointestinal: Negative for abdominal pain. Negative for melena or hematemesis. +bloating  Musculoskeletal: Negative for myalgias.    Skin: Negative for jaundice or itching.  Neurological: Negative for confusion or slowed mentation. Negative for tremors.   Hematological: Does not bruise/bleed easily.       Physical Exam    Constitutional: No distress. Alert and oriented.  Eyes: No scleral icterus.   Pulmonary/Chest: Respiratory effort normal. No respiratory distress.   Abdominal: No distension, no ascites appreciated.   Extremities: No edema.   Neurological: No tremor.   Skin: No jaundice.   Psychiatric: Normal mood and affect. Speech, behavior, and thought content normal.       LABS & DIAGNOSTIC STUDIES     I have personally reviewed pertinent laboratory findings:    Lab Results   Component Value Date    ALT 66 (H) 04/11/2025    AST 34 04/11/2025    ALKPHOS 59 04/11/2025    BILITOT 0.5 04/11/2025    ALBUMIN 3.8 04/11/2025    INR 1.0 06/01/2018     Lab Results   Component Value Date    WBC 3.30 (L) 03/10/2023    HGB 13.6 03/10/2023    HCT 43.2 03/10/2023    MCV 93 03/10/2023     03/10/2023     Lab Results   Component Value Date     03/10/2023    K 3.9 03/10/2023    BUN 9 03/10/2023    CREATININE 0.8 03/10/2023    ESTGFRAFRICA >60.0 02/04/2022    EGFRNONAA >60.0 02/04/2022     Lab Results   Component Value Date    SMOOTHMUSCAB Negative 1:40 06/01/2018    AMAIFA Positive 1:640 (A) 06/01/2018    IGGSERUM 1676 (H) 06/01/2018    ANASCREEN Negative <1:160 06/01/2018    FERRITIN 100 02/02/2016    FESATURATED 15 (L) 02/02/2016    PETH Negative 06/06/2019    JITHP4DGDJGM MM 02/02/2016    CBQBD6UIYKVS 108 02/02/2016    CERULOPLSM 26.0 02/02/2016    HEPBSAG Negative 02/02/2016    HEPBCAB Negative 02/02/2016    HEPCAB Negative 02/02/2016    HCVRNAQUANTP <12 08/01/2016    LVX82GXLN Negative 06/02/2020     Lab Results   Component Value Date    AFP 7.5 06/09/2017     DIAGNOSTIC STUDIES: Reviewed in Epic.    ASSESSMENT & PLAN     44 y.o. female with:    1. Metabolic dysfunction-associated steatotic liver disease (MASLD)    2. Elevated liver enzymes    3. Antimitochondrial antibody positive    4. Class 1 obesity with body mass index (BMI) of 34.0 to 34.9 in adult, unspecified obesity type, unspecified whether serious comorbidity present     5. Pre-diabetes      Orders Placed This Encounter   Procedures    Hepatic Function Panel     - Fibroscan today to non-invasively re-stage liver disease.  - Recommend an Ultrasound of the liver every 2 years.  - Repeat liver function tests in 1 year. If ALP becomes elevated in the future, will start Ursodiol.  - Continue Wegovy, as prescribed for weight loss.  - Recommend additional weight loss goal of 20 lbs, through diet and exercise.  - Recommend good control of cholesterol, blood pressure, & blood sugar levels.  - Avoid herbal supplements/alternative remedies.  - Limit alcohol intake to no more than 5-7 standard drinks per week, ideally less.  - Continue follow up with Rheumatology, as planned.  - Return to clinic in 1 year.     Thank you for allowing me to participate in the care of Zeferino Moon       Hepatology Nurse Practitioner  [facility]      Duration of encounter: 30 min  This includes face to face time and non-face to face time preparing to see the patient (eg, review of tests), obtaining and/or reviewing separately obtained history, documenting clinical information in the electronic or other health record, independently interpreting results and communicating results to the patient/family/caregiver, or care coordination.

## 2025-04-22 NOTE — PROCEDURES
FibroScan Transplant Hepatology(Vibration Controlled Transient Elastography)    Date/Time: 4/22/2025 10:30 AM    Performed by: Clarita Reyes NP  Authorized by: Nikki Pearson NP    Diagnosis:  NAFLD    Probe:  XL    Universal Protocol: Patient's identity, procedure and site were verified, confirmatory pause was performed.  Discussed procedure including risks and potential complications.  Questions answered.  Patient verbalizes understanding and wishes to proceed with VCTE.     Procedure: After providing explanations of the procedure, patient was placed in the supine position with right arm in maximum abduction to allow optimal exposure of right lateral abdomen.  Patient was briefly assessed, Testing was performed in the mid-axillary location, 50Hz Shear Wave pulses were applied and the resulting Shear Wave and Propagation Speed detected with a 3.5 MHz ultrasonic signal, using the FibroScan probe, Skin to liver capsule distance and liver parenchyma were accessed during the entire examination with the FibroScan probe, Patient was instructed to breathe normally and to abstain from sudden movements during the procedure, allowing for random measurements of liver stiffness. At least 10 Shear Waves were produced, Individual measurements of each Shear Wave were calculated.  Patient tolerated the procedure well with no complications.  Meets discharge criteria as was dismissed.  Rates pain 0 out of 10.  Patient will follow up with ordering provider to review results.    Findings  Median liver stiffness score:  3.7  CAP Reading: dB/m:  276    IQR/med %:  12  Interpretation  Fibrosis interpretation is based on medial liver stiffness - Kilopascal (kPa).    Fibrosis Stage:  F 0-1  Steatosis interpretation is based on controlled attenuation parameter - (dB/m).    Steatosis Grade:  S2